# Patient Record
Sex: FEMALE | Race: WHITE | NOT HISPANIC OR LATINO | Employment: UNEMPLOYED | URBAN - METROPOLITAN AREA
[De-identification: names, ages, dates, MRNs, and addresses within clinical notes are randomized per-mention and may not be internally consistent; named-entity substitution may affect disease eponyms.]

---

## 2017-02-03 ENCOUNTER — RESULTS ENCOUNTER (OUTPATIENT)
Dept: FAMILY MEDICINE CLINIC | Facility: CLINIC | Age: 52
End: 2017-02-03

## 2017-02-03 DIAGNOSIS — IMO0002 DIABETES MELLITUS TYPE 2, UNCONTROLLED: ICD-10-CM

## 2017-02-03 DIAGNOSIS — K21.9 GASTROESOPHAGEAL REFLUX DISEASE, ESOPHAGITIS PRESENCE NOT SPECIFIED: ICD-10-CM

## 2017-02-03 DIAGNOSIS — F41.9 ANXIETY: ICD-10-CM

## 2017-02-03 DIAGNOSIS — E78.5 HYPERLIPIDEMIA: ICD-10-CM

## 2017-02-03 DIAGNOSIS — I10 ESSENTIAL HYPERTENSION: ICD-10-CM

## 2017-03-01 ENCOUNTER — OFFICE (AMBULATORY)
Dept: URBAN - METROPOLITAN AREA CLINIC 64 | Facility: CLINIC | Age: 52
End: 2017-03-01

## 2017-03-01 ENCOUNTER — AMBULATORY SURGICAL CENTER (AMBULATORY)
Dept: URBAN - METROPOLITAN AREA SURGERY 17 | Facility: SURGERY | Age: 52
End: 2017-03-01

## 2017-03-01 VITALS
DIASTOLIC BLOOD PRESSURE: 60 MMHG | HEART RATE: 76 BPM | SYSTOLIC BLOOD PRESSURE: 93 MMHG | DIASTOLIC BLOOD PRESSURE: 56 MMHG | RESPIRATION RATE: 19 BRPM | DIASTOLIC BLOOD PRESSURE: 85 MMHG | DIASTOLIC BLOOD PRESSURE: 62 MMHG | HEART RATE: 77 BPM | DIASTOLIC BLOOD PRESSURE: 90 MMHG | SYSTOLIC BLOOD PRESSURE: 86 MMHG | OXYGEN SATURATION: 98 % | SYSTOLIC BLOOD PRESSURE: 127 MMHG | OXYGEN SATURATION: 97 % | DIASTOLIC BLOOD PRESSURE: 74 MMHG | RESPIRATION RATE: 20 BRPM | RESPIRATION RATE: 21 BRPM | DIASTOLIC BLOOD PRESSURE: 70 MMHG | SYSTOLIC BLOOD PRESSURE: 99 MMHG | DIASTOLIC BLOOD PRESSURE: 48 MMHG | WEIGHT: 190 LBS | SYSTOLIC BLOOD PRESSURE: 104 MMHG | RESPIRATION RATE: 18 BRPM | HEART RATE: 70 BPM | HEART RATE: 73 BPM | OXYGEN SATURATION: 96 % | OXYGEN SATURATION: 93 % | SYSTOLIC BLOOD PRESSURE: 97 MMHG | RESPIRATION RATE: 22 BRPM | DIASTOLIC BLOOD PRESSURE: 58 MMHG | RESPIRATION RATE: 23 BRPM | HEIGHT: 62 IN | SYSTOLIC BLOOD PRESSURE: 136 MMHG | SYSTOLIC BLOOD PRESSURE: 115 MMHG | HEART RATE: 74 BPM | DIASTOLIC BLOOD PRESSURE: 59 MMHG | DIASTOLIC BLOOD PRESSURE: 51 MMHG | SYSTOLIC BLOOD PRESSURE: 128 MMHG | HEART RATE: 75 BPM | SYSTOLIC BLOOD PRESSURE: 98 MMHG | TEMPERATURE: 98 F | HEART RATE: 83 BPM | RESPIRATION RATE: 17 BRPM | HEART RATE: 78 BPM | RESPIRATION RATE: 15 BRPM | TEMPERATURE: 98.3 F | RESPIRATION RATE: 16 BRPM | SYSTOLIC BLOOD PRESSURE: 89 MMHG | HEART RATE: 66 BPM

## 2017-03-01 DIAGNOSIS — R12 HEARTBURN: ICD-10-CM

## 2017-03-01 DIAGNOSIS — D12.3 BENIGN NEOPLASM OF TRANSVERSE COLON: ICD-10-CM

## 2017-03-01 DIAGNOSIS — D12.0 BENIGN NEOPLASM OF CECUM: ICD-10-CM

## 2017-03-01 DIAGNOSIS — K29.70 GASTRITIS, UNSPECIFIED, WITHOUT BLEEDING: ICD-10-CM

## 2017-03-01 DIAGNOSIS — K59.00 CONSTIPATION, UNSPECIFIED: ICD-10-CM

## 2017-03-01 DIAGNOSIS — D12.4 BENIGN NEOPLASM OF DESCENDING COLON: ICD-10-CM

## 2017-03-01 DIAGNOSIS — K59.1 FUNCTIONAL DIARRHEA: ICD-10-CM

## 2017-03-01 DIAGNOSIS — R14.0 ABDOMINAL DISTENSION (GASEOUS): ICD-10-CM

## 2017-03-01 DIAGNOSIS — K20.9 ESOPHAGITIS, UNSPECIFIED: ICD-10-CM

## 2017-03-01 DIAGNOSIS — R10.84 GENERALIZED ABDOMINAL PAIN: ICD-10-CM

## 2017-03-01 DIAGNOSIS — K31.7 POLYP OF STOMACH AND DUODENUM: ICD-10-CM

## 2017-03-01 DIAGNOSIS — R11.0 NAUSEA: ICD-10-CM

## 2017-03-01 PROBLEM — K64.8 OTHER HEMORRHOIDS: Status: ACTIVE | Noted: 2017-03-01

## 2017-03-01 LAB
GI HISTOLOGY: A. UNSPECIFIED: (no result)
GI HISTOLOGY: B. UNSPECIFIED: (no result)
GI HISTOLOGY: C. UNSPECIFIED: (no result)
GI HISTOLOGY: D. UNSPECIFIED: (no result)
GI HISTOLOGY: E. UNSPECIFIED: (no result)
GI HISTOLOGY: F. UNSPECIFIED: (no result)
GI HISTOLOGY: G. UNSPECIFIED: (no result)
GI HISTOLOGY: PDF REPORT: (no result)

## 2017-03-01 PROCEDURE — 43239 EGD BIOPSY SINGLE/MULTIPLE: CPT

## 2017-03-01 PROCEDURE — 88305 TISSUE EXAM BY PATHOLOGIST: CPT

## 2017-03-01 PROCEDURE — 45380 COLONOSCOPY AND BIOPSY: CPT

## 2017-03-01 RX ADMIN — PROPOFOL 100 MG: 10 INJECTION, EMULSION INTRAVENOUS at 13:16

## 2017-03-01 RX ADMIN — PROPOFOL 100 MG: 10 INJECTION, EMULSION INTRAVENOUS at 13:12

## 2017-03-01 RX ADMIN — PROPOFOL 100 MG: 10 INJECTION, EMULSION INTRAVENOUS at 12:59

## 2017-03-01 RX ADMIN — PROPOFOL 100 MG: 10 INJECTION, EMULSION INTRAVENOUS at 12:55

## 2017-03-01 RX ADMIN — PROPOFOL 100 MG: 10 INJECTION, EMULSION INTRAVENOUS at 13:07

## 2017-03-01 RX ADMIN — PROPOFOL 100 MG: 10 INJECTION, EMULSION INTRAVENOUS at 13:20

## 2017-03-01 RX ADMIN — PROPOFOL 100 MG: 10 INJECTION, EMULSION INTRAVENOUS at 13:03

## 2017-03-08 ENCOUNTER — OFFICE VISIT (OUTPATIENT)
Dept: FAMILY MEDICINE CLINIC | Facility: CLINIC | Age: 52
End: 2017-03-08

## 2017-03-08 VITALS
TEMPERATURE: 98 F | SYSTOLIC BLOOD PRESSURE: 130 MMHG | RESPIRATION RATE: 16 BRPM | DIASTOLIC BLOOD PRESSURE: 78 MMHG | WEIGHT: 188 LBS | HEIGHT: 62 IN | HEART RATE: 68 BPM | BODY MASS INDEX: 34.6 KG/M2

## 2017-03-08 DIAGNOSIS — G47.9 SLEEP DISTURBANCE: ICD-10-CM

## 2017-03-08 DIAGNOSIS — F41.9 ANXIETY: ICD-10-CM

## 2017-03-08 DIAGNOSIS — IMO0001 UNCONTROLLED TYPE 2 DIABETES MELLITUS WITHOUT COMPLICATION, WITHOUT LONG-TERM CURRENT USE OF INSULIN: ICD-10-CM

## 2017-03-08 DIAGNOSIS — I10 ESSENTIAL HYPERTENSION: Primary | ICD-10-CM

## 2017-03-08 DIAGNOSIS — K21.9 GASTROESOPHAGEAL REFLUX DISEASE, ESOPHAGITIS PRESENCE NOT SPECIFIED: ICD-10-CM

## 2017-03-08 DIAGNOSIS — Z12.31 ENCOUNTER FOR SCREENING MAMMOGRAM FOR BREAST CANCER: ICD-10-CM

## 2017-03-08 DIAGNOSIS — E78.49 OTHER HYPERLIPIDEMIA: ICD-10-CM

## 2017-03-08 PROBLEM — E66.9 OBESITY (BMI 30-39.9): Status: ACTIVE | Noted: 2017-03-08

## 2017-03-08 PROCEDURE — 99214 OFFICE O/P EST MOD 30 MIN: CPT | Performed by: FAMILY MEDICINE

## 2017-03-08 RX ORDER — ATORVASTATIN CALCIUM 20 MG/1
20 TABLET, FILM COATED ORAL NIGHTLY
Qty: 30 TABLET | Refills: 5 | Status: SHIPPED | OUTPATIENT
Start: 2017-03-08 | End: 2017-09-06 | Stop reason: SDUPTHER

## 2017-03-08 RX ORDER — HYDROCHLOROTHIAZIDE 25 MG/1
25 TABLET ORAL DAILY
Qty: 30 TABLET | Refills: 5 | Status: SHIPPED | OUTPATIENT
Start: 2017-03-08 | End: 2017-09-06 | Stop reason: SDUPTHER

## 2017-03-08 RX ORDER — AMLODIPINE BESYLATE 5 MG/1
5 TABLET ORAL DAILY
Qty: 30 TABLET | Refills: 5 | Status: SHIPPED | OUTPATIENT
Start: 2017-03-08 | End: 2017-09-06 | Stop reason: SDUPTHER

## 2017-03-08 RX ORDER — OMEPRAZOLE 40 MG/1
40 CAPSULE, DELAYED RELEASE ORAL NIGHTLY
Qty: 30 CAPSULE | Refills: 5 | Status: SHIPPED | OUTPATIENT
Start: 2017-03-08 | End: 2017-09-06 | Stop reason: SDUPTHER

## 2017-03-08 RX ORDER — CARVEDILOL 12.5 MG/1
12.5 TABLET ORAL 2 TIMES DAILY WITH MEALS
Qty: 60 TABLET | Refills: 5 | Status: SHIPPED | OUTPATIENT
Start: 2017-03-08 | End: 2017-09-06 | Stop reason: SDUPTHER

## 2017-03-08 RX ORDER — METFORMIN HYDROCHLORIDE 500 MG/1
1000 TABLET, EXTENDED RELEASE ORAL
Qty: 60 TABLET | Refills: 5 | Status: SHIPPED | OUTPATIENT
Start: 2017-03-08 | End: 2017-09-06 | Stop reason: SDUPTHER

## 2017-03-08 NOTE — PROGRESS NOTES
"Chief Complaint   Patient presents with   • Hypertension   • Hyperlipidemia       Subjective   This patient presents the office for medicine refill.  Labs are improved.  She continues to have some elevation of triglycerides.  Her liver function tests have markedly improved.  There has been interval weight loss.  Blood pressure is to goal.  LDL cholesterol is to goal.  GERD symptoms are stable.  She has had recent endoscopies which were reportedly normal.  She continues to have excess fatigue.  In the past she was evaluated for sleep apnea but she was not fitted with a mask.  We will re-evaluate for sleep apnea.      Review of Systems   Constitutional: Positive for fatigue.   Cardiovascular: Negative for chest pain.       Objective   Visit Vitals   • /78   • Pulse 68   • Temp 98 °F (36.7 °C) (Oral)   • Resp 16   • Ht 62\" (157.5 cm)   • Wt 188 lb (85.3 kg)   • BMI 34.39 kg/m2     Body mass index is 34.39 kg/(m^2).  Physical Exam   Constitutional: She is cooperative. No distress.   Eyes: Conjunctivae and lids are normal.   Neck: Carotid bruit is not present. No tracheal deviation present.   Cardiovascular: Normal rate, regular rhythm and normal heart sounds.    No murmur heard.  Pulmonary/Chest: Effort normal and breath sounds normal.   Neurological: She is alert. She is not disoriented.   Skin: Skin is warm and dry.   Multiple tattoos   Psychiatric: She has a normal mood and affect. Her speech is normal and behavior is normal.   Vitals reviewed.      Assessment/Plan     Problem List Items Addressed This Visit        Cardiovascular and Mediastinum    Hyperlipidemia    Relevant Medications    atorvastatin (LIPITOR) 20 MG tablet    Other Relevant Orders    Lipid Panel With LDL/HDL Ratio    Essential hypertension - Primary    Relevant Medications    amLODIPine (NORVASC) 5 MG tablet    hydrochlorothiazide (HYDRODIURIL) 25 MG tablet    carvedilol (COREG) 12.5 MG tablet    Other Relevant Orders    Comprehensive " metabolic panel    CBC and Differential    TSH       Digestive    GERD (gastroesophageal reflux disease)    Relevant Medications    omeprazole (priLOSEC) 40 MG capsule       Endocrine    Diabetes mellitus type 2, uncontrolled    Relevant Medications    metFORMIN ER (GLUCOPHAGE-XR) 500 MG 24 hr tablet    Other Relevant Orders    Hemoglobin A1c    MicroAlbumin, Urine, Random    Ambulatory referral to Ophthalmology       Other    Anxiety    Relevant Medications    sertraline (ZOLOFT) 50 MG tablet    Sleep disturbance    Relevant Orders    Ambulatory Referral to Sleep Medicine      Other Visit Diagnoses     Encounter for screening mammogram for breast cancer        Relevant Orders    Mammo Screening Bilateral With CAD          Outpatient Encounter Prescriptions as of 3/8/2017   Medication Sig Dispense Refill   • ALPRAZolam (XANAX) 0.5 MG tablet   4   • aspirin 81 MG tablet Take 81 mg by mouth daily.     • Omega-3 Fatty Acids (FISH OIL PO) Take  by mouth.     • amLODIPine (NORVASC) 5 MG tablet Take 1 tablet by mouth Daily for 180 days. 30 tablet 5   • atorvastatin (LIPITOR) 20 MG tablet Take 1 tablet by mouth Every Night for 180 days. 30 tablet 5   • carvedilol (COREG) 12.5 MG tablet Take 1 tablet by mouth 2 (Two) Times a Day With Meals for 180 days. 60 tablet 5   • hydrochlorothiazide (HYDRODIURIL) 25 MG tablet Take 1 tablet by mouth Daily for 180 days. 30 tablet 5   • metFORMIN ER (GLUCOPHAGE-XR) 500 MG 24 hr tablet Take 2 tablets by mouth Daily With Dinner for 180 days. 60 tablet 5   • omeprazole (priLOSEC) 40 MG capsule Take 1 capsule by mouth Every Night for 180 days. 30 capsule 5   • sertraline (ZOLOFT) 50 MG tablet Take 1 tablet by mouth Daily for 180 days. 30 tablet 5   • [DISCONTINUED] amLODIPine (NORVASC) 5 MG tablet Take 1 tablet by mouth daily for 180 days. 30 tablet 5   • [DISCONTINUED] atorvastatin (LIPITOR) 20 MG tablet Take 1 tablet by mouth every night for 180 days. 30 tablet 5   • [DISCONTINUED]  carvedilol (COREG) 12.5 MG tablet Take 1 tablet by mouth 2 (two) times a day with meals for 180 days. 60 tablet 5   • [DISCONTINUED] hydrochlorothiazide (HYDRODIURIL) 25 MG tablet Take 1 tablet by mouth daily for 180 days. 30 tablet 5   • [DISCONTINUED] metFORMIN XR (GLUCOPHAGE-XR) 500 MG 24 hr tablet Take 2 tablets by mouth daily with dinner for 180 days. 60 tablet 5   • [DISCONTINUED] omeprazole (PriLOSEC) 40 MG capsule Take 1 capsule by mouth every night for 180 days. 30 capsule 5   • [DISCONTINUED] sertraline (ZOLOFT) 50 MG tablet Take 1 tablet by mouth daily for 180 days. 30 tablet 5     No facility-administered encounter medications on file as of 3/8/2017.        Orders Placed This Encounter   Procedures   • Mammo Screening Bilateral With CAD     Standing Status:   Future     Standing Expiration Date:   3/9/2018     Order Specific Question:   Reason for Exam:     Answer:   screening     Order Specific Question:   Is the patient pregnant?     Answer:   No   • Comprehensive metabolic panel     Standing Status:   Future     Standing Expiration Date:   12/3/2017   • Lipid Panel With LDL/HDL Ratio     Standing Status:   Future     Standing Expiration Date:   12/3/2017   • TSH     Standing Status:   Future     Standing Expiration Date:   12/3/2017   • Hemoglobin A1c     Standing Status:   Future     Standing Expiration Date:   12/3/2017   • MicroAlbumin, Urine, Random     Standing Status:   Future     Standing Expiration Date:   12/3/2017   • Ambulatory Referral to Sleep Medicine     Referral Priority:   Routine     Referral Type:   Consultation     Referral Reason:   Specialty Services Required     Requested Specialty:   Sleep Medicine     Number of Visits Requested:   1   • Ambulatory referral to Ophthalmology     Referral Priority:   Routine     Referral Type:   Consultation     Referral Reason:   Specialty Services Required     Referred to Provider:   Tammy Milton MD     Requested Specialty:    Ophthalmology     Number of Visits Requested:   1       Continue with current treatment plan.  Diet given. Stress reduction with exercise       F/U in 6 months

## 2017-03-08 NOTE — PATIENT INSTRUCTIONS
Serving Sizes  A serving size is a measured amount of food or drink, such as one slice of bread, that has an associated nutrient content. Knowing the serving size of a food or drink can help you determine how much of that food you should consume.   WHAT IS THE SIZE OF ONE SERVING?  The size of one healthy serving depends on the food or drink. To determine a serving size, read the food label. If the food or drink does not have a food label, try to find serving size information online. Or, use the following to estimate the size of one adult serving:   Grain  1 slice bread. ½ bagel. ½ cup pasta.   Vegetable  ½ cup cooked or canned vegetables. 1 cup raw, leafy greens.   Fruit  ½ cup canned fruit. 1 medium fruit. ¼ cup dried fruit.   Meat and Other Protein Sources  1 oz meat, poultry, or fish. ¼ cup cooked beans. 1 egg. ¼ cup nuts or seeds. 1 Tbsp nut butter. ¼ cup tofu or tempeh. 2 Tbsp hummus.   Dairy  An individual container of yogurt (6-8 oz). 1 piece of cheese the size of your thumb (1 oz). 1 cup (8 oz) milk or milk alternative.   Fat  A piece the size of one dice. 1 tsp soft margarine. 1 Tbsp mayonnaise. 1 tsp vegetable oil. 1 Tbsp regular salad dressing. 2 Tbsp low-fat salad dressing.   HOW MANY SERVINGS SHOULD I EAT FROM EACH FOOD GROUP EACH DAY?   The following are the suggested number of servings to try and have every day from each food group. You can also look at your eating throughout the week and aim for meeting these requirements on most days for overall healthy eating.   Grain  6-8 servings. Try to have half of your grains from whole grains, such as whole wheat bread, corn tortillas, oatmeal, brown rice, whole wheat pasta, and bulgur.  Vegetable  At least 2½-3 servings.   Fruit  2 servings.   Meat and Other Protein Foods  5-6 servings. Aim to have lean proteins, such as chicken, turkey, fish, beans, or tofu.  Dairy  3 servings. Choose low-fat or nonfat if you are trying to control your weight.   Fat  2-3  servings.   IS A SERVING THE SAME THING AS A PORTION?  No. A portion is the actual amount you eat, which may be more than one serving. Knowing the specific serving size of a food and the nutritional information that goes with it can help you make a healthy decision on what size portion to eat.   WHAT ARE SOME TIPS TO HELP ME LEARN HEALTHY SERVING SIZES?  · Check food labels for serving sizes. Many foods that come as a single portion actually contain multiple servings.  · Determine the serving size of foods you commonly eat and figure out how large a portion you usually eat.  · Measure the number of servings that can be held by the bowls, glasses, cups, and plates you typically use. For example, pour your breakfast cereal into your regular bowl and then pour it into a measuring cup.  · For 1-2 days, measure the serving sizes of all the foods you eat.  · Practice estimating serving sizes and determining how big your portions should be.     This information is not intended to replace advice given to you by your health care provider. Make sure you discuss any questions you have with your health care provider.     Document Released: 09/15/2004 Document Revised: 01/08/2016 Document Reviewed: 03/17/2015  [employer] Interactive Patient Education ©2016 [employer]      Adjustment Disorder  Adjustment disorder is an unusually severe reaction to a stressful life event, such as the loss of a job or physical illness. The event may be any stressful event other than the loss of a loved one. Adjustment disorder may affect your feelings, your thinking, how you act, or a combination of these. It may interfere with personal relationships or with the way you are at work, school, or home. People with this disorder are at risk for suicide and substance abuse. They may develop a more serious mental disorder, such as major depressive disorder or post-traumatic stress disorder.  SIGNS AND SYMPTOMS   Symptoms may include:  · Sadness, depressed  mood, or crying spells.  · Loss of enjoyment.  · Change in appetite or weight.  · Sense of loss or hopelessness.  · Thoughts of suicide.  · Anxiety, worry, or nervousness.  · Trouble sleeping.  · Avoiding family and friends.  · Poor school performance.  · Fighting or vandalism.  · Reckless driving.  · Skipping school.  · Poor work performance.  · Ignoring bills.  Symptoms of adjustment disorder start within 3 months of the stressful life event. They do not last more than 6 months after the event has ended.  DIAGNOSIS   To make a diagnosis, your health care provider will ask about what has happened in your life and how it has affected you. He or she may also ask about your medical history and use of medicines, alcohol, and other substances. Your health care provider may do a physical exam and order lab tests or other studies. You may be referred to a mental health specialist for evaluation.  TREATMENT   Treatment options include:  · Counseling or talk therapy. Talk therapy is usually provided by mental health specialists.  · Medicine. Certain medicines may help with depression, anxiety, and sleep.  · Support groups. Support groups offer emotional support, advice, and guidance. They are made up of people who have had similar experiences.  HOME CARE INSTRUCTIONS  · Keep all follow-up visits as directed by your health care provider. This is important.  · Take medicines only as directed by your health care provider.  SEEK MEDICAL CARE IF:   Your symptoms get worse.   SEEK IMMEDIATE MEDICAL CARE IF:  You have serious thoughts about hurting yourself or someone else.  MAKE SURE YOU:  · Understand these instructions.  · Will watch your condition.  · Will get help right away if you are not doing well or get worse.     This information is not intended to replace advice given to you by your health care provider. Make sure you discuss any questions you have with your health care provider.     Document Released: 08/22/2007  Document Revised: 11/28/2016 Document Reviewed: 05/12/2015  [employer] Interactive Patient Education ©2016 [employer]

## 2017-03-23 DIAGNOSIS — E78.5 HYPERLIPIDEMIA: ICD-10-CM

## 2017-03-23 DIAGNOSIS — I10 ESSENTIAL HYPERTENSION: ICD-10-CM

## 2017-03-23 DIAGNOSIS — F41.9 ANXIETY: ICD-10-CM

## 2017-03-23 DIAGNOSIS — IMO0002 DIABETES MELLITUS TYPE 2, UNCONTROLLED: ICD-10-CM

## 2017-03-23 RX ORDER — CARVEDILOL 12.5 MG/1
TABLET ORAL
Qty: 60 TABLET | Refills: 0 | OUTPATIENT
Start: 2017-03-23

## 2017-03-23 RX ORDER — HYDROCHLOROTHIAZIDE 25 MG/1
TABLET ORAL
Qty: 30 TABLET | Refills: 0 | OUTPATIENT
Start: 2017-03-23

## 2017-03-23 RX ORDER — METFORMIN HYDROCHLORIDE 500 MG/1
TABLET, EXTENDED RELEASE ORAL
Qty: 60 TABLET | Refills: 0 | OUTPATIENT
Start: 2017-03-23

## 2017-03-23 RX ORDER — ATORVASTATIN CALCIUM 20 MG/1
TABLET, FILM COATED ORAL
Qty: 30 TABLET | Refills: 0 | OUTPATIENT
Start: 2017-03-23

## 2017-03-23 RX ORDER — AMLODIPINE BESYLATE 5 MG/1
TABLET ORAL
Qty: 30 TABLET | Refills: 0 | OUTPATIENT
Start: 2017-03-23

## 2017-04-19 ENCOUNTER — PROCEDURE VISIT (OUTPATIENT)
Dept: FAMILY MEDICINE CLINIC | Facility: CLINIC | Age: 52
End: 2017-04-19

## 2017-04-19 VITALS
WEIGHT: 190 LBS | HEART RATE: 74 BPM | RESPIRATION RATE: 16 BRPM | HEIGHT: 62 IN | DIASTOLIC BLOOD PRESSURE: 87 MMHG | BODY MASS INDEX: 34.96 KG/M2 | SYSTOLIC BLOOD PRESSURE: 131 MMHG | TEMPERATURE: 98.1 F

## 2017-04-19 DIAGNOSIS — Z12.31 ENCOUNTER FOR SCREENING MAMMOGRAM FOR BREAST CANCER: ICD-10-CM

## 2017-04-19 DIAGNOSIS — Z01.419 WELL FEMALE EXAM WITH ROUTINE GYNECOLOGICAL EXAM: Primary | ICD-10-CM

## 2017-04-19 DIAGNOSIS — E66.9 OBESITY (BMI 30-39.9): ICD-10-CM

## 2017-04-19 PROCEDURE — 99396 PREV VISIT EST AGE 40-64: CPT | Performed by: FAMILY MEDICINE

## 2017-04-19 NOTE — PROGRESS NOTES
"Chief Complaint   Patient presents with   • Gynecologic Exam       Subjective   This patient presents today for annual GYN examination.  She does not have any breast symptoms.  She has not had Pap smears since 2014.  Currently she is not having any vaginal discharge or vaginal problems.  Her periods continue to be irregular.  They have slowed somewhat.  She now gets them about every 2 months or so.  She has never stopped having periods so far.  Data Reviewed:    No visits with results within 30 Day(s) from this visit.  Latest known visit with results is:    Conversion Encounter on 09/06/2016   Component Date Value   • Glucose 09/06/2016 122*   • Hemoglobin A1C 09/06/2016 7.2*   • BUN 09/06/2016 6    • Creatinine 09/06/2016 0.56*   • eGFR Non African Am 09/06/2016 108    • eGFR  Am 09/06/2016 124    • BUN/Creatinine Ratio 09/06/2016 11    • Sodium 09/06/2016 137    • Potassium 09/06/2016 4.2    • Chloride 09/06/2016 92*   • Total CO2 09/06/2016 25    • Calcium 09/06/2016 10.0    • Total Protein 09/06/2016 7.4    • Albumin 09/06/2016 4.5    • Globulin 09/06/2016 2.9    • A/G Ratio 09/06/2016 1.6    • Total Bilirubin 09/06/2016 0.4    • Alkaline Phosphatase 09/06/2016 126*   • AST (SGOT) 09/06/2016 28    • ALT (SGPT) 09/06/2016 33*   • Total Cholesterol 09/06/2016 109    • Triglycerides 09/06/2016 191*   • HDL Cholesterol 09/06/2016 38*   • VLDL Cholesterol 09/06/2016 38    • LDL Cholesterol  09/06/2016 33    • TSH 09/06/2016 2.460    • Creatinine, Urine 09/06/2016 83.2    • Microalbumin, Urine 09/06/2016 4.0    • Microalbumin/Creatinine * 09/06/2016 4.8        Social History   Substance Use Topics   • Smoking status: Never Smoker   • Smokeless tobacco: Never Used   • Alcohol use Yes       Review of Systems   Constitutional:        Denies breast pain or discharge   Genitourinary: Negative for vaginal discharge.       Objective   /87  Pulse 74  Temp 98.1 °F (36.7 °C) (Oral)   Resp 16  Ht 62\" (157.5 cm) "  Wt 190 lb (86.2 kg)  BMI 34.75 kg/m2  Body mass index is 34.75 kg/(m^2).  Physical Exam   Constitutional: She appears well-developed. No distress.   Genitourinary: Vagina normal and uterus normal. Pelvic exam was performed with patient supine. No labial fusion. There is no rash, tenderness or lesion on the right labia. There is no rash, tenderness or lesion on the left labia. Cervix exhibits no motion tenderness, no discharge and no friability. Right adnexum displays no mass and no tenderness. Left adnexum displays no mass and no tenderness.   Genitourinary Comments: PAP done   Vitals reviewed.      Assessment/Plan     Problem List Items Addressed This Visit        Digestive    Obesity (BMI 30-39.9)      Other Visit Diagnoses     Well female exam with routine gynecological exam    -  Primary    Relevant Orders    PapIG, HPV, Rfx 16 / 18    Encounter for screening mammogram for breast cancer        Relevant Orders    Mammo Screening Bilateral With CAD          Outpatient Encounter Prescriptions as of 4/19/2017   Medication Sig Dispense Refill   • ALPRAZolam (XANAX) 0.5 MG tablet   4   • amLODIPine (NORVASC) 5 MG tablet Take 1 tablet by mouth Daily for 180 days. 30 tablet 5   • aspirin 81 MG tablet Take 81 mg by mouth daily.     • atorvastatin (LIPITOR) 20 MG tablet Take 1 tablet by mouth Every Night for 180 days. 30 tablet 5   • carvedilol (COREG) 12.5 MG tablet Take 1 tablet by mouth 2 (Two) Times a Day With Meals for 180 days. 60 tablet 5   • hydrochlorothiazide (HYDRODIURIL) 25 MG tablet Take 1 tablet by mouth Daily for 180 days. 30 tablet 5   • metFORMIN ER (GLUCOPHAGE-XR) 500 MG 24 hr tablet Take 2 tablets by mouth Daily With Dinner for 180 days. 60 tablet 5   • Omega-3 Fatty Acids (FISH OIL PO) Take  by mouth.     • omeprazole (priLOSEC) 40 MG capsule Take 1 capsule by mouth Every Night for 180 days. 30 capsule 5   • sertraline (ZOLOFT) 50 MG tablet Take 1 tablet by mouth Daily for 180 days. 30 tablet 5      No facility-administered encounter medications on file as of 4/19/2017.        Orders Placed This Encounter   Procedures   • Mammo Screening Bilateral With CAD     Standing Status:   Future     Standing Expiration Date:   10/16/2017     Order Specific Question:   Reason for Exam:     Answer:   screening     Order Specific Question:   Patient Pregnant     Answer:   No       Continue with current treatment plan.         F/U in one year for breast exam and mammogram, keep regular follow up appts.

## 2017-04-19 NOTE — PATIENT INSTRUCTIONS
Obesity, Adult  Obesity is the condition of having too much total body fat. Being overweight or obese means that your weight is greater than what is considered healthy for your body size. Obesity is determined by a measurement called BMI. BMI is an estimate of body fat and is calculated from height and weight. For adults, a BMI of 30 or higher is considered obese.  Obesity can eventually lead to other health concerns and major illnesses, including:  · Stroke.  · Coronary artery disease (CAD).  · Type 2 diabetes.  · Some types of cancer, including cancers of the colon, breast, uterus, and gallbladder.  · Osteoarthritis.  · High blood pressure (hypertension).  · High cholesterol.  · Sleep apnea.  · Gallbladder stones.  · Infertility problems.   CAUSES  The main cause of obesity is taking in (consuming) more calories than your body uses for energy. Other factors that contribute to this condition may include:  · Being born with genes that make you more likely to become obese.  · Having a medical condition that causes obesity. These conditions include:    Hypothyroidism.    Polycystic ovarian syndrome (PCOS).    Binge-eating disorder.    Cushing syndrome.  · Taking certain medicines, such as steroids, antidepressants, and seizure medicines.  · Not being physically active (sedentary lifestyle).  · Living where there are limited places to exercise safely or buy healthy foods.  · Not getting enough sleep.  RISK FACTORS  The following factors may increase your risk of this condition:  · Having a family history of obesity.  · Being a woman of -American descent.  · Being a man of  descent.  SYMPTOMS  Having excessive body fat is the main symptom of this condition.  DIAGNOSIS  This condition may be diagnosed based on:  · Your symptoms.  · Your medical history.  · A physical exam. Your health care provider may measure:    Your BMI. If you are an adult with a BMI between 25 and less than 30, you are considered  overweight. If you are an adult with a BMI of 30 or higher, you are considered obese.    The distances around your hips and your waist (circumferences). These may be compared to each other to help diagnose your condition.    Your skinfold thickness. Your health care provider may gently pinch a fold of your skin and measure it.  TREATMENT  Treatment for this condition often includes changing your lifestyle. Treatment may include some or all of the following:  · Dietary changes. Work with your health care provider and a dietitian to set a weight-loss goal that is healthy and reasonable for you. Dietary changes may include eating:    Smaller portions. A portion size is the amount of a particular food that is healthy for you to eat at one time. This varies from person to person.    Low-calorie or low-fat options.    More whole grains, fruits, and vegetables.  · Regular physical activity. This may include aerobic activity (cardio) and strength training.  · Medicine to help you lose weight. Your health care provider may prescribe medicine if you are unable to lose 1 pound a week after 6 weeks of eating more healthily and doing more physical activity.  · Surgery. Surgical options may include gastric banding and gastric bypass. Surgery may be done if:    Other treatments have not helped to improve your condition.    You have a BMI of 40 or higher.    You have life-threatening health problems related to obesity.  HOME CARE INSTRUCTIONS  Eating and Drinking  · Follow recommendations from your health care provider about what you eat and drink. Your health care provider may advise you to:    Limit fast foods, sweets, and processed snack foods.    Choose low-fat options, such as low-fat milk instead of whole milk.    Eat 5 or more servings of fruits or vegetables every day.    Eat at home more often. This gives you more control over what you eat.    Choose healthy foods when you eat out.    Learn what a healthy portion size  is.    Keep low-fat snacks on hand.    Avoid sugary drinks, such as soda, fruit juice, iced tea sweetened with sugar, and flavored milk.    Eat a healthy breakfast.  · Drink enough water to keep your urine clear or pale yellow.  · Do not go without eating for long periods of time (do not fast) or follow a fad diet. Fasting and fad diets can be unhealthy and even dangerous.  Physical Activity  · Exercise regularly, as told by your health care provider. Ask your health care provider what types of exercise are safe for you and how often you should exercise.  · Warm up and stretch before being active.  · Cool down and stretch after being active.  · Rest between periods of activity.  Lifestyle  · Limit the time that you spend in front of your TV, computer, or video game system.  · Find ways to reward yourself that do not involve food.  · Limit alcohol intake to no more than 1 drink a day for nonpregnant women and 2 drinks a day for men. One drink equals 12 oz of beer, 5 oz of wine, or 1½ oz of hard liquor.  General Instructions  · Keep a weight loss journal to keep track of the food you eat and how much you exercise you get.  · Take over-the-counter and prescription medicines only as told by your health care provider.  · Take vitamins and supplements only as told by your health care provider.  · Consider joining a support group. Your health care provider may be able to recommend a support group.  · Keep all follow-up visits as told by your health care provider. This is important.  SEEK MEDICAL CARE IF:  · You are unable to meet your weight loss goal after 6 weeks of dietary and lifestyle changes.     This information is not intended to replace advice given to you by your health care provider. Make sure you discuss any questions you have with your health care provider.     Document Released: 01/25/2006 Document Revised: 01/13/2017 Document Reviewed: 10/05/2016  Elsevier Interactive Patient Education ©2016 [employer]      Exercising to Lose Weight  Exercising can help you to lose weight. In order to lose weight through exercise, you need to do vigorous-intensity exercise. You can tell that you are exercising with vigorous intensity if you are breathing very hard and fast and cannot hold a conversation while exercising.  Moderate-intensity exercise helps to maintain your current weight. You can tell that you are exercising at a moderate level if you have a higher heart rate and faster breathing, but you are still able to hold a conversation.  HOW OFTEN SHOULD I EXERCISE?  Choose an activity that you enjoy and set realistic goals. Your health care provider can help you to make an activity plan that works for you. Exercise regularly as directed by your health care provider. This may include:  · Doing resistance training twice each week, such as:    Push-ups.    Sit-ups.    Lifting weights.    Using resistance bands.  · Doing a given intensity of exercise for a given amount of time. Choose from these options:    150 minutes of moderate-intensity exercise every week.    75 minutes of vigorous-intensity exercise every week.    A mix of moderate-intensity and vigorous-intensity exercise every week.  Children, pregnant women, people who are out of shape, people who are overweight, and older adults may need to consult a health care provider for individual recommendations. If you have any sort of medical condition, be sure to consult your health care provider before starting a new exercise program.  WHAT ARE SOME ACTIVITIES THAT CAN HELP ME TO LOSE WEIGHT?   · Walking at a rate of at least 4.5 miles an hour.  · Jogging or running at a rate of 5 miles per hour.  · Biking at a rate of at least 10 miles per hour.  · Lap swimming.  · Roller-skating or in-line skating.  · Cross-country skiing.  · Vigorous competitive sports, such as football, basketball, and soccer.  · Jumping rope.  · Aerobic dancing.  HOW CAN I BE MORE ACTIVE IN MY  DAY-TO-DAY ACTIVITIES?  · Use the stairs instead of the elevator.  · Take a walk during your lunch break.  · If you drive, park your car farther away from work or school.  · If you take public transportation, get off one stop early and walk the rest of the way.  · Make all of your phone calls while standing up and walking around.  · Get up, stretch, and walk around every 30 minutes throughout the day.  WHAT GUIDELINES SHOULD I FOLLOW WHILE EXERCISING?  · Do not exercise so much that you hurt yourself, feel dizzy, or get very short of breath.  · Consult your health care provider prior to starting a new exercise program.  · Wear comfortable clothes and shoes with good support.  · Drink plenty of water while you exercise to prevent dehydration or heat stroke. Body water is lost during exercise and must be replaced.  · Work out until you breathe faster and your heart beats faster.     This information is not intended to replace advice given to you by your health care provider. Make sure you discuss any questions you have with your health care provider.     Document Released: 01/20/2012 Document Revised: 01/08/2016 Document Reviewed: 05/21/2015  Fingerprint Interactive Patient Education ©2016 Elsevier Inc.      MyPlate from [employer]  The general, healthful diet is based on the 2010 Dietary Guidelines for Americans. The amount of food you need to eat from each food group depends on your age, sex, and level of physical activity and can be individualized by a dietitian. Go to ChooseMyPlate.gov for more information.  WHAT DO I NEED TO KNOW ABOUT THE MYPLATE PLAN?  · Enjoy your food, but eat less.    · Avoid oversized portions.      ½ of your plate should include fruits and vegetables.    ¼ of your plate should be grains.    ¼ of your plate should be protein.  Grains  · Make at least half of your grains whole grains.  · For a 2,000 calorie daily food plan, eat 6 oz every day.  · 1 oz is about 1 slice bread, 1 cup cereal, or ½ cup  cooked rice, cereal, or pasta.  Vegetables  · Make half your plate fruits and vegetables.  · For a 2,000 calorie daily food plan, eat 2½ cups every day.  · 1 cup is about 1 cup raw or cooked vegetables or vegetable juice or 2 cups raw leafy greens.  Fruits  · Make half your plate fruits and vegetables.  · For a 2,000 calorie daily food plan, eat 2 cups every day.  · 1 cup is about 1 cup fruit or 100% fruit juice or ½ cup dried fruit.  Protein  · For a 2,000 calorie daily food plan, eat 5½ oz every day.  · 1 oz is about 1 oz meat, poultry, or fish, ¼ cup cooked beans, 1 egg, 1 Tbsp peanut butter, or ½ oz nuts or seeds.  Dairy  · Switch to fat-free or low-fat (1%) milk.  · For a 2,000 calorie daily food plan, eat 3 cups every day.  · 1 cup is about 1 cup milk or yogurt or soy milk (soy beverage), 1½ oz natural cheese, or 2 oz processed cheese.  Fats, Oils, and Empty Calories  · Only small amounts of oils are recommended.  · Empty calories are calories from solid fats or added sugars.  · Compare sodium in foods like soup, bread, and frozen meals. Choose the foods with lower numbers.  · Drink water instead of sugary drinks.  WHAT FOODS CAN I EAT?  Grains  Whole grains such as whole wheat, quinoa, millet, and bulgur. Bread, rolls, and pasta made from whole grains. Brown or wild rice. Hot or cold cereals made from whole grains and without added sugar.  Vegetables  All fresh vegetables, especially fresh red, dark green, or orange vegetables. Peas and beans. Low-sodium frozen or canned vegetables prepared without added salt. Low-sodium vegetable juices.  Fruits  All fresh, frozen, and dried fruits. Canned fruit packed in water or fruit juice without added sugar. Fruit juices without added sugar.  Meats and Other Protein Sources  Boiled, baked, or grilled lean meat trimmed of fat. Skinless poultry. Fresh seafood and shellfish. Canned seafood packed in water. Unsalted nuts and unsalted nut butters. Tofu. Dried beans and pea.  Eggs.  Dairy  Low-fat or fat-free milk, yogurt, and cheeses.   Sweets and Desserts  Frozen desserts made from low-fat milk.  Fats and Oils  Olive, peanut, and canola oils and margarine. Salad dressing and mayonnaise made from these oils.  Other  Soups and casseroles made from allowed ingredients and without added fat or salt.  The items listed above may not be a complete list of recommended foods or beverages. Contact your dietitian for more options.   WHAT FOODS ARE NOT RECOMMENDED?  Grains  Sweetened, low-fiber cereals. Packaged baked goods. Snack crackers and chips. Cheese crackers, butter crackers, and biscuits. Frozen waffles, sweet breads, doughnuts, pastries, packaged baking mixes, pancakes, cakes, and cookies.  Vegetables  Regular canned or frozen vegetables or vegetables prepared with salt. Canned tomatoes. Canned tomato sauce. Fried vegetables. Vegetables in cream sauce or cheese sauce.  Fruits  Fruits packed in syrup or made with added sugar.   Meats and Other Protein Sources  Marbled or fatty meats such as ribs. Poultry with skin. Fried meats, poultry, eggs, or fish. Sausages, hot dogs, and deli meats such as pastrami, bologna, or salami.  Dairy  Whole milk, cream, cheeses made from whole milk, sour cream. Ice cream or yogurt made from whole milk or with added sugar.  Beverages  For adults, no more than one alcoholic drink per day. Regular soft drinks or other sugary beverages. Juice drinks.  Sweets and Desserts  Sugary or fatty desserts, candy, and other sweets.  Fats and Oils  Solid shortening or partially hydrogenated oils. Solid margarine. Margarine that contains trans fats. Butter.  The items listed above may not be a complete list of foods and beverages to avoid. Contact your dietitian for more information.     This information is not intended to replace advice given to you by your health care provider. Make sure you discuss any questions you have with your health care provider.     Document  Released: 01/06/2009 Document Revised: 01/08/2016 Document Reviewed: 11/26/2014  Elsevier Interactive Patient Education ©2016 [employer]vier Inc.

## 2017-04-21 LAB
CYTOLOGIST CVX/VAG CYTO: NORMAL
CYTOLOGY CVX/VAG DOC THIN PREP: NORMAL
DX ICD CODE: NORMAL
HIV 1 & 2 AB SER-IMP: NORMAL
HPV I/H RISK 1 DNA CVX QL PROBE+SIG AMP: NEGATIVE
OTHER STN SPEC: NORMAL
PATH REPORT.FINAL DX SPEC: NORMAL
STAT OF ADQ CVX/VAG CYTO-IMP: NORMAL

## 2017-04-23 NOTE — PROGRESS NOTES
Your Pap smear results are normal.  Our next test should be in 3 years.  Yearly breast examinations and mammograms are advisable.  Dr. Sánchez

## 2017-07-13 ENCOUNTER — TELEPHONE (OUTPATIENT)
Dept: FAMILY MEDICINE CLINIC | Facility: CLINIC | Age: 52
End: 2017-07-13

## 2017-07-13 DIAGNOSIS — Z12.39 SCREENING FOR BREAST CANCER: Primary | ICD-10-CM

## 2017-07-13 NOTE — TELEPHONE ENCOUNTER
----- Message from Hunter Sánchez MD sent at 7/13/2017 10:15 AM EDT -----  Regarding: FW: PT MAMMO ORDER  Ewa, I am not sure how to do this. Lets get the schedulers in on this to change the venue. I am ok in changing it. Thanks. Dr. Sánchez  ----- Message -----     From: Ewa Perkins LPN     Sent: 7/13/2017  10:01 AM       To: Hunter Sánchez MD  Subject: FW: PT MAMMO ORDER                                   ----- Message -----     From: Kecia Akbar     Sent: 7/13/2017   9:36 AM       To: Ewa Perkins LPN  Subject: PT MAMMO ORDER                                   I called this pt today about another matter. She informed me that she has moved to [place]. She has requested for her mammo order be sent to [facility] in [place]. Can you get that done for her?

## 2017-08-02 ENCOUNTER — APPOINTMENT (OUTPATIENT)
Dept: OTHER | Facility: HOSPITAL | Age: 52
End: 2017-08-02

## 2017-08-02 ENCOUNTER — HOSPITAL ENCOUNTER (OUTPATIENT)
Dept: MAMMOGRAPHY | Facility: HOSPITAL | Age: 52
Discharge: HOME | End: 2017-08-02
Admitting: FAMILY MEDICINE

## 2017-08-02 DIAGNOSIS — Z92.89 HX OF MAMMOGRAM: ICD-10-CM

## 2017-08-02 DIAGNOSIS — Z12.39 SCREENING FOR BREAST CANCER: ICD-10-CM

## 2017-08-02 PROCEDURE — 77063 BREAST TOMOSYNTHESIS BI: CPT | Performed by: RADIOLOGY

## 2017-08-02 PROCEDURE — G0202 SCR MAMMO BI INCL CAD: HCPCS

## 2017-08-02 PROCEDURE — 77067 SCR MAMMO BI INCL CAD: CPT | Performed by: RADIOLOGY

## 2017-08-02 PROCEDURE — 77063 BREAST TOMOSYNTHESIS BI: CPT

## 2017-08-05 ENCOUNTER — RESULTS ENCOUNTER (OUTPATIENT)
Dept: FAMILY MEDICINE CLINIC | Facility: CLINIC | Age: 52
End: 2017-08-05

## 2017-08-05 DIAGNOSIS — I10 ESSENTIAL HYPERTENSION: ICD-10-CM

## 2017-08-05 DIAGNOSIS — IMO0001 UNCONTROLLED TYPE 2 DIABETES MELLITUS WITHOUT COMPLICATION, WITHOUT LONG-TERM CURRENT USE OF INSULIN: ICD-10-CM

## 2017-08-05 DIAGNOSIS — E78.49 OTHER HYPERLIPIDEMIA: ICD-10-CM

## 2017-09-06 ENCOUNTER — OFFICE VISIT (OUTPATIENT)
Dept: FAMILY MEDICINE CLINIC | Facility: CLINIC | Age: 52
End: 2017-09-06

## 2017-09-06 VITALS
WEIGHT: 187 LBS | HEIGHT: 62 IN | DIASTOLIC BLOOD PRESSURE: 84 MMHG | TEMPERATURE: 97.7 F | HEART RATE: 67 BPM | RESPIRATION RATE: 16 BRPM | SYSTOLIC BLOOD PRESSURE: 126 MMHG | BODY MASS INDEX: 34.41 KG/M2

## 2017-09-06 DIAGNOSIS — I10 ESSENTIAL HYPERTENSION: Primary | ICD-10-CM

## 2017-09-06 DIAGNOSIS — K21.9 GASTROESOPHAGEAL REFLUX DISEASE, ESOPHAGITIS PRESENCE NOT SPECIFIED: ICD-10-CM

## 2017-09-06 DIAGNOSIS — Z11.59 NEED FOR HEPATITIS C SCREENING TEST: ICD-10-CM

## 2017-09-06 DIAGNOSIS — IMO0001 UNCONTROLLED TYPE 2 DIABETES MELLITUS WITHOUT COMPLICATION, WITHOUT LONG-TERM CURRENT USE OF INSULIN: ICD-10-CM

## 2017-09-06 DIAGNOSIS — E78.49 OTHER HYPERLIPIDEMIA: ICD-10-CM

## 2017-09-06 DIAGNOSIS — Z23 IMMUNIZATION DUE: ICD-10-CM

## 2017-09-06 DIAGNOSIS — F41.9 ANXIETY: ICD-10-CM

## 2017-09-06 PROCEDURE — 90732 PPSV23 VACC 2 YRS+ SUBQ/IM: CPT | Performed by: FAMILY MEDICINE

## 2017-09-06 PROCEDURE — 90686 IIV4 VACC NO PRSV 0.5 ML IM: CPT | Performed by: FAMILY MEDICINE

## 2017-09-06 PROCEDURE — 90472 IMMUNIZATION ADMIN EACH ADD: CPT | Performed by: FAMILY MEDICINE

## 2017-09-06 PROCEDURE — 99214 OFFICE O/P EST MOD 30 MIN: CPT | Performed by: FAMILY MEDICINE

## 2017-09-06 PROCEDURE — 90471 IMMUNIZATION ADMIN: CPT | Performed by: FAMILY MEDICINE

## 2017-09-06 RX ORDER — AMLODIPINE BESYLATE 5 MG/1
5 TABLET ORAL DAILY
Qty: 30 TABLET | Refills: 5 | Status: SHIPPED | OUTPATIENT
Start: 2017-09-06 | End: 2018-03-26 | Stop reason: SDUPTHER

## 2017-09-06 RX ORDER — ATORVASTATIN CALCIUM 20 MG/1
20 TABLET, FILM COATED ORAL NIGHTLY
Qty: 30 TABLET | Refills: 5 | Status: SHIPPED | OUTPATIENT
Start: 2017-09-06 | End: 2018-03-26 | Stop reason: SDUPTHER

## 2017-09-06 RX ORDER — HYDROCHLOROTHIAZIDE 25 MG/1
25 TABLET ORAL DAILY
Qty: 30 TABLET | Refills: 5 | Status: SHIPPED | OUTPATIENT
Start: 2017-09-06 | End: 2018-03-26 | Stop reason: SDUPTHER

## 2017-09-06 RX ORDER — CARVEDILOL 12.5 MG/1
12.5 TABLET ORAL 2 TIMES DAILY WITH MEALS
Qty: 60 TABLET | Refills: 5 | Status: SHIPPED | OUTPATIENT
Start: 2017-09-06 | End: 2018-03-26 | Stop reason: SDUPTHER

## 2017-09-06 RX ORDER — OMEPRAZOLE 40 MG/1
40 CAPSULE, DELAYED RELEASE ORAL NIGHTLY
Qty: 30 CAPSULE | Refills: 5 | Status: SHIPPED | OUTPATIENT
Start: 2017-09-06 | End: 2018-03-26 | Stop reason: SDUPTHER

## 2017-09-06 RX ORDER — METFORMIN HYDROCHLORIDE 500 MG/1
1000 TABLET, EXTENDED RELEASE ORAL
Qty: 60 TABLET | Refills: 5 | Status: SHIPPED | OUTPATIENT
Start: 2017-09-06 | End: 2018-03-26 | Stop reason: SDUPTHER

## 2017-09-06 NOTE — PROGRESS NOTES
"Chief Complaint   Patient presents with   • Hypertension   • Hyperlipidemia   • Diabetes     foot exam due       Subjective   This patient presents the office to refill medicines.  Labs are due.  Blood pressure is to goal on current therapy.  GERD symptoms are controlled with omeprazole.  Lipids are stable pending labs.  Diabetes is stable pending labs.  Anxiety issues are improved due to her change in life situation.  She is near to her family living in [place] and this helps with caregiver stress.  Currently she is in the same working situation but working from home.  She is due for immunization update.  I have reviewed and updated her medications, medical history and problem list during today's office visit.        Social History   Substance Use Topics   • Smoking status: Never Smoker   • Smokeless tobacco: Never Used   • Alcohol use Yes       Review of Systems   Constitutional: Negative for fatigue.   Cardiovascular: Negative for chest pain.       Objective   /84  Pulse 67  Temp 97.7 °F (36.5 °C) (Oral)   Resp 16  Ht 62\" (157.5 cm)  Wt 187 lb (84.8 kg)  BMI 34.2 kg/m2  Body mass index is 34.2 kg/(m^2).  Physical Exam   Constitutional: She is cooperative. No distress.   Eyes: Conjunctivae and lids are normal.   Neck: Carotid bruit is not present. No tracheal deviation present.   Cardiovascular: Normal rate, regular rhythm and normal heart sounds.    No murmur heard.  Pulmonary/Chest: Effort normal and breath sounds normal.    Oseas had a diabetic foot exam performed (normal appearance) today.   During the foot exam she had a monofilament test performed (normal  sensation bilaterally ).    Neurological Sensory Findings -  No right ankle/foot altered proprioception and no left ankle/foot altered proprioception    Vascular Status -  Her exam exhibits right foot vasculature normal. Her exam exhibits no right foot edema. Her exam exhibits left foot vasculature normal. Her exam exhibits no left foot " edema.   Skin Integrity  -  Her right foot skin is intact.     Lcarla 's left foot skin is intact. .  Neurological: She is alert. She is not disoriented.   Skin: Skin is warm and dry.   Psychiatric: She has a normal mood and affect. Her speech is normal and behavior is normal.   Vitals reviewed.      Data Reviewed:        Assessment/Plan     Problem List Items Addressed This Visit        Cardiovascular and Mediastinum    Hyperlipidemia    Relevant Medications    atorvastatin (LIPITOR) 20 MG tablet    Other Relevant Orders    Lipid Panel With LDL/HDL Ratio    Essential hypertension - Primary    Relevant Medications    amLODIPine (NORVASC) 5 MG tablet    carvedilol (COREG) 12.5 MG tablet    hydrochlorothiazide (HYDRODIURIL) 25 MG tablet    Other Relevant Orders    Comprehensive metabolic panel    CBC and Differential    TSH       Digestive    GERD (gastroesophageal reflux disease)    Relevant Medications    omeprazole (priLOSEC) 40 MG capsule       Endocrine    Diabetes mellitus type 2, uncontrolled    Relevant Medications    metFORMIN ER (GLUCOPHAGE-XR) 500 MG 24 hr tablet    Other Relevant Orders    Comprehensive metabolic panel    CBC and Differential    Hemoglobin A1c    MicroAlbumin, Urine, Random       Other    Anxiety    Relevant Medications    sertraline (ZOLOFT) 50 MG tablet      Other Visit Diagnoses     Need for hepatitis C screening test        Relevant Orders    Hepatitis C Antibody    Immunization due        Relevant Orders    Flu Vaccine Quad PF 3YR+    Pneumococcal Polysaccharide Vaccine 23-Valent Greater Than or Equal To 1yo Subcutaneous / IM          Outpatient Encounter Prescriptions as of 9/6/2017   Medication Sig Dispense Refill   • ALPRAZolam (XANAX) 0.5 MG tablet   4   • aspirin 81 MG tablet Take 81 mg by mouth daily.     • Omega-3 Fatty Acids (FISH OIL PO) Take  by mouth.     • amLODIPine (NORVASC) 5 MG tablet Take 1 tablet by mouth Daily for 180 days. 30 tablet 5   • atorvastatin (LIPITOR) 20  MG tablet Take 1 tablet by mouth Every Night for 180 days. 30 tablet 5   • carvedilol (COREG) 12.5 MG tablet Take 1 tablet by mouth 2 (Two) Times a Day With Meals for 180 days. 60 tablet 5   • hydrochlorothiazide (HYDRODIURIL) 25 MG tablet Take 1 tablet by mouth Daily for 180 days. 30 tablet 5   • metFORMIN ER (GLUCOPHAGE-XR) 500 MG 24 hr tablet Take 2 tablets by mouth Daily With Dinner for 180 days. 60 tablet 5   • omeprazole (priLOSEC) 40 MG capsule Take 1 capsule by mouth Every Night for 180 days. 30 capsule 5   • sertraline (ZOLOFT) 50 MG tablet Take 1 tablet by mouth Daily for 180 days. 30 tablet 5   • [DISCONTINUED] amLODIPine (NORVASC) 5 MG tablet Take 1 tablet by mouth Daily for 180 days. 30 tablet 5   • [DISCONTINUED] atorvastatin (LIPITOR) 20 MG tablet Take 1 tablet by mouth Every Night for 180 days. 30 tablet 5   • [DISCONTINUED] carvedilol (COREG) 12.5 MG tablet Take 1 tablet by mouth 2 (Two) Times a Day With Meals for 180 days. 60 tablet 5   • [DISCONTINUED] hydrochlorothiazide (HYDRODIURIL) 25 MG tablet Take 1 tablet by mouth Daily for 180 days. 30 tablet 5   • [DISCONTINUED] metFORMIN ER (GLUCOPHAGE-XR) 500 MG 24 hr tablet Take 2 tablets by mouth Daily With Dinner for 180 days. 60 tablet 5   • [DISCONTINUED] omeprazole (priLOSEC) 40 MG capsule Take 1 capsule by mouth Every Night for 180 days. 30 capsule 5   • [DISCONTINUED] sertraline (ZOLOFT) 50 MG tablet Take 1 tablet by mouth Daily for 180 days. 30 tablet 5     No facility-administered encounter medications on file as of 9/6/2017.        Orders Placed This Encounter   Procedures   • Flu Vaccine Quad PF 3YR+   • Pneumococcal Polysaccharide Vaccine 23-Valent Greater Than or Equal To 3yo Subcutaneous / IM   • Hepatitis C Antibody   • Comprehensive metabolic panel     Standing Status:   Future     Standing Expiration Date:   6/3/2018   • Lipid Panel With LDL/HDL Ratio     Standing Status:   Future     Standing Expiration Date:   6/3/2018   • TSH      Standing Status:   Future     Standing Expiration Date:   6/3/2018   • Hemoglobin A1c     Standing Status:   Future     Standing Expiration Date:   6/3/2018   • MicroAlbumin, Urine, Random     Standing Status:   Future     Standing Expiration Date:   6/3/2018   • CBC and Differential     Standing Status:   Future     Standing Expiration Date:   6/3/2018     Order Specific Question:   Manual Differential     Answer:   No       Continue with current treatment plan.         F/U in 6 months

## 2017-11-01 ENCOUNTER — LAB (OUTPATIENT)
Dept: LAB | Facility: HOSPITAL | Age: 52
End: 2017-11-01

## 2017-11-01 DIAGNOSIS — Z11.59 SCREENING EXAMINATION FOR POLIOMYELITIS: Primary | ICD-10-CM

## 2017-11-01 LAB
ALBUMIN SERPL-MCNC: 4.3 G/DL (ref 3.2–4.8)
ALBUMIN/GLOB SERPL: 1.8 G/DL (ref 1.5–2.5)
ALP SERPL-CCNC: 117 U/L (ref 25–100)
ALT SERPL W P-5'-P-CCNC: 25 U/L (ref 7–40)
ANION GAP SERPL CALCULATED.3IONS-SCNC: 10 MMOL/L (ref 3–11)
AST SERPL-CCNC: 18 U/L (ref 0–33)
BASOPHILS # BLD AUTO: 0.03 10*3/MM3 (ref 0–0.2)
BASOPHILS NFR BLD AUTO: 0.4 % (ref 0–1)
BILIRUB SERPL-MCNC: 0.5 MG/DL (ref 0.3–1.2)
BUN BLD-MCNC: 9 MG/DL (ref 9–23)
BUN/CREAT SERPL: 18 (ref 7–25)
CALCIUM SPEC-SCNC: 9.4 MG/DL (ref 8.7–10.4)
CHLORIDE SERPL-SCNC: 96 MMOL/L (ref 99–109)
CHOLEST SERPL-MCNC: 113 MG/DL (ref 0–200)
CO2 SERPL-SCNC: 28 MMOL/L (ref 20–31)
CREAT BLD-MCNC: 0.5 MG/DL (ref 0.6–1.3)
DEPRECATED RDW RBC AUTO: 41.7 FL (ref 37–54)
EOSINOPHIL # BLD AUTO: 0.14 10*3/MM3 (ref 0–0.3)
EOSINOPHIL NFR BLD AUTO: 1.8 % (ref 0–3)
ERYTHROCYTE [DISTWIDTH] IN BLOOD BY AUTOMATED COUNT: 13.6 % (ref 11.3–14.5)
GFR SERPL CREATININE-BSD FRML MDRD: 129 ML/MIN/1.73
GLOBULIN UR ELPH-MCNC: 2.4 GM/DL
GLUCOSE BLD-MCNC: 92 MG/DL (ref 70–100)
HBA1C MFR BLD: 6.7 % (ref 4.8–5.6)
HCT VFR BLD AUTO: 34.8 % (ref 34.5–44)
HCV AB SER DONR QL: NORMAL
HDLC SERPL-MCNC: 42 MG/DL (ref 40–60)
HGB BLD-MCNC: 11.4 G/DL (ref 11.5–15.5)
IMM GRANULOCYTES # BLD: 0.02 10*3/MM3 (ref 0–0.03)
IMM GRANULOCYTES NFR BLD: 0.3 % (ref 0–0.6)
LDLC SERPL CALC-MCNC: 32 MG/DL (ref 0–100)
LDLC/HDLC SERPL: 0.76 {RATIO}
LYMPHOCYTES # BLD AUTO: 2.26 10*3/MM3 (ref 0.6–4.8)
LYMPHOCYTES NFR BLD AUTO: 28.3 % (ref 24–44)
MCH RBC QN AUTO: 27.7 PG (ref 27–31)
MCHC RBC AUTO-ENTMCNC: 32.8 G/DL (ref 32–36)
MCV RBC AUTO: 84.5 FL (ref 80–99)
MONOCYTES # BLD AUTO: 0.38 10*3/MM3 (ref 0–1)
MONOCYTES NFR BLD AUTO: 4.8 % (ref 0–12)
NEUTROPHILS # BLD AUTO: 5.16 10*3/MM3 (ref 1.5–8.3)
NEUTROPHILS NFR BLD AUTO: 64.4 % (ref 41–71)
PLATELET # BLD AUTO: 318 10*3/MM3 (ref 150–450)
PMV BLD AUTO: 9.6 FL (ref 6–12)
POTASSIUM BLD-SCNC: 3.6 MMOL/L (ref 3.5–5.5)
PROT SERPL-MCNC: 6.7 G/DL (ref 5.7–8.2)
RBC # BLD AUTO: 4.12 10*6/MM3 (ref 3.89–5.14)
SODIUM BLD-SCNC: 134 MMOL/L (ref 132–146)
TRIGL SERPL-MCNC: 195 MG/DL (ref 0–150)
TSH SERPL DL<=0.05 MIU/L-ACNC: 2.22 MIU/ML (ref 0.35–5.35)
VLDLC SERPL-MCNC: 39 MG/DL
WBC NRBC COR # BLD: 7.99 10*3/MM3 (ref 3.5–10.8)

## 2017-11-01 PROCEDURE — 82043 UR ALBUMIN QUANTITATIVE: CPT | Performed by: FAMILY MEDICINE

## 2017-11-01 PROCEDURE — 86803 HEPATITIS C AB TEST: CPT | Performed by: FAMILY MEDICINE

## 2017-11-01 PROCEDURE — 84443 ASSAY THYROID STIM HORMONE: CPT | Performed by: FAMILY MEDICINE

## 2017-11-01 PROCEDURE — 83036 HEMOGLOBIN GLYCOSYLATED A1C: CPT | Performed by: FAMILY MEDICINE

## 2017-11-01 PROCEDURE — 85025 COMPLETE CBC W/AUTO DIFF WBC: CPT | Performed by: FAMILY MEDICINE

## 2017-11-01 PROCEDURE — 80053 COMPREHEN METABOLIC PANEL: CPT | Performed by: FAMILY MEDICINE

## 2017-11-01 PROCEDURE — 36415 COLL VENOUS BLD VENIPUNCTURE: CPT | Performed by: FAMILY MEDICINE

## 2017-11-01 PROCEDURE — 80061 LIPID PANEL: CPT | Performed by: FAMILY MEDICINE

## 2017-11-03 LAB — MICROALBUMIN UR-MCNC: <3 UG/ML

## 2018-01-16 ENCOUNTER — TRANSCRIBE ORDERS (OUTPATIENT)
Dept: LAB | Facility: HOSPITAL | Age: 53
End: 2018-01-16

## 2018-01-16 ENCOUNTER — LAB (OUTPATIENT)
Dept: LAB | Facility: HOSPITAL | Age: 53
End: 2018-01-16

## 2018-01-16 DIAGNOSIS — M77.9 TENDONITIS: ICD-10-CM

## 2018-01-16 DIAGNOSIS — M10.9 GOUT, UNSPECIFIED CAUSE, UNSPECIFIED CHRONICITY, UNSPECIFIED SITE: Primary | ICD-10-CM

## 2018-01-16 DIAGNOSIS — M10.9 GOUT, UNSPECIFIED CAUSE, UNSPECIFIED CHRONICITY, UNSPECIFIED SITE: ICD-10-CM

## 2018-01-16 LAB
ALBUMIN SERPL-MCNC: 4.7 G/DL (ref 3.2–4.8)
ALBUMIN/GLOB SERPL: 1.7 G/DL (ref 1.5–2.5)
ALP SERPL-CCNC: 119 U/L (ref 25–100)
ALT SERPL W P-5'-P-CCNC: 29 U/L (ref 7–40)
ANION GAP SERPL CALCULATED.3IONS-SCNC: 7 MMOL/L (ref 3–11)
AST SERPL-CCNC: 21 U/L (ref 0–33)
BASOPHILS # BLD AUTO: 0.03 10*3/MM3 (ref 0–0.2)
BASOPHILS NFR BLD AUTO: 0.4 % (ref 0–1)
BILIRUB SERPL-MCNC: 0.4 MG/DL (ref 0.3–1.2)
BUN BLD-MCNC: 10 MG/DL (ref 9–23)
BUN/CREAT SERPL: 25 (ref 7–25)
CALCIUM SPEC-SCNC: 10 MG/DL (ref 8.7–10.4)
CHLORIDE SERPL-SCNC: 97 MMOL/L (ref 99–109)
CO2 SERPL-SCNC: 32 MMOL/L (ref 20–31)
CREAT BLD-MCNC: 0.4 MG/DL (ref 0.6–1.3)
CRP SERPL-MCNC: 1.32 MG/DL (ref 0–1)
DEPRECATED RDW RBC AUTO: 43.8 FL (ref 37–54)
EOSINOPHIL # BLD AUTO: 0.21 10*3/MM3 (ref 0–0.3)
EOSINOPHIL NFR BLD AUTO: 2.9 % (ref 0–3)
ERYTHROCYTE [DISTWIDTH] IN BLOOD BY AUTOMATED COUNT: 14.2 % (ref 11.3–14.5)
ERYTHROCYTE [SEDIMENTATION RATE] IN BLOOD: 74 MM/HR (ref 0–30)
GFR SERPL CREATININE-BSD FRML MDRD: >150 ML/MIN/1.73
GLOBULIN UR ELPH-MCNC: 2.8 GM/DL
GLUCOSE BLD-MCNC: 121 MG/DL (ref 70–100)
HCT VFR BLD AUTO: 38.4 % (ref 34.5–44)
HGB BLD-MCNC: 12.7 G/DL (ref 11.5–15.5)
IMM GRANULOCYTES # BLD: 0.03 10*3/MM3 (ref 0–0.03)
IMM GRANULOCYTES NFR BLD: 0.4 % (ref 0–0.6)
LYMPHOCYTES # BLD AUTO: 2.36 10*3/MM3 (ref 0.6–4.8)
LYMPHOCYTES NFR BLD AUTO: 32.3 % (ref 24–44)
MCH RBC QN AUTO: 28.3 PG (ref 27–31)
MCHC RBC AUTO-ENTMCNC: 33.1 G/DL (ref 32–36)
MCV RBC AUTO: 85.5 FL (ref 80–99)
MONOCYTES # BLD AUTO: 0.34 10*3/MM3 (ref 0–1)
MONOCYTES NFR BLD AUTO: 4.7 % (ref 0–12)
NEUTROPHILS # BLD AUTO: 4.33 10*3/MM3 (ref 1.5–8.3)
NEUTROPHILS NFR BLD AUTO: 59.3 % (ref 41–71)
PLATELET # BLD AUTO: 338 10*3/MM3 (ref 150–450)
PMV BLD AUTO: 10.3 FL (ref 6–12)
POTASSIUM BLD-SCNC: 4 MMOL/L (ref 3.5–5.5)
PROT SERPL-MCNC: 7.5 G/DL (ref 5.7–8.2)
RBC # BLD AUTO: 4.49 10*6/MM3 (ref 3.89–5.14)
SODIUM BLD-SCNC: 136 MMOL/L (ref 132–146)
URATE SERPL-MCNC: 5.8 MG/DL (ref 3.1–7.8)
WBC NRBC COR # BLD: 7.3 10*3/MM3 (ref 3.5–10.8)

## 2018-01-16 PROCEDURE — 86140 C-REACTIVE PROTEIN: CPT | Performed by: PODIATRIST

## 2018-01-16 PROCEDURE — 85025 COMPLETE CBC W/AUTO DIFF WBC: CPT

## 2018-01-16 PROCEDURE — 85652 RBC SED RATE AUTOMATED: CPT | Performed by: PODIATRIST

## 2018-01-16 PROCEDURE — 84550 ASSAY OF BLOOD/URIC ACID: CPT

## 2018-01-16 PROCEDURE — 36415 COLL VENOUS BLD VENIPUNCTURE: CPT | Performed by: PODIATRIST

## 2018-01-16 PROCEDURE — 80053 COMPREHEN METABOLIC PANEL: CPT | Performed by: PODIATRIST

## 2018-02-06 ENCOUNTER — RESULTS ENCOUNTER (OUTPATIENT)
Dept: FAMILY MEDICINE CLINIC | Facility: CLINIC | Age: 53
End: 2018-02-06

## 2018-02-06 DIAGNOSIS — E78.49 OTHER HYPERLIPIDEMIA: ICD-10-CM

## 2018-02-06 DIAGNOSIS — IMO0001 UNCONTROLLED TYPE 2 DIABETES MELLITUS WITHOUT COMPLICATION, WITHOUT LONG-TERM CURRENT USE OF INSULIN: ICD-10-CM

## 2018-02-06 DIAGNOSIS — I10 ESSENTIAL HYPERTENSION: ICD-10-CM

## 2018-03-26 ENCOUNTER — OFFICE VISIT (OUTPATIENT)
Dept: FAMILY MEDICINE CLINIC | Facility: CLINIC | Age: 53
End: 2018-03-26

## 2018-03-26 VITALS
HEART RATE: 73 BPM | TEMPERATURE: 96.9 F | RESPIRATION RATE: 16 BRPM | SYSTOLIC BLOOD PRESSURE: 126 MMHG | WEIGHT: 190 LBS | HEIGHT: 62 IN | BODY MASS INDEX: 34.96 KG/M2 | DIASTOLIC BLOOD PRESSURE: 72 MMHG

## 2018-03-26 DIAGNOSIS — E66.09 CLASS 1 OBESITY DUE TO EXCESS CALORIES WITH SERIOUS COMORBIDITY AND BODY MASS INDEX (BMI) OF 34.0 TO 34.9 IN ADULT: ICD-10-CM

## 2018-03-26 DIAGNOSIS — E11.9 TYPE 2 DIABETES MELLITUS WITHOUT COMPLICATION, WITHOUT LONG-TERM CURRENT USE OF INSULIN (HCC): ICD-10-CM

## 2018-03-26 DIAGNOSIS — F41.9 ANXIETY: Primary | ICD-10-CM

## 2018-03-26 DIAGNOSIS — K21.9 GASTROESOPHAGEAL REFLUX DISEASE WITHOUT ESOPHAGITIS: ICD-10-CM

## 2018-03-26 DIAGNOSIS — E78.49 OTHER HYPERLIPIDEMIA: ICD-10-CM

## 2018-03-26 DIAGNOSIS — I10 ESSENTIAL HYPERTENSION: ICD-10-CM

## 2018-03-26 PROBLEM — E66.811 CLASS 1 OBESITY DUE TO EXCESS CALORIES WITH SERIOUS COMORBIDITY AND BODY MASS INDEX (BMI) OF 34.0 TO 34.9 IN ADULT: Status: ACTIVE | Noted: 2018-03-26

## 2018-03-26 PROBLEM — E66.9 OBESITY (BMI 30-39.9): Status: RESOLVED | Noted: 2017-03-08 | Resolved: 2018-03-26

## 2018-03-26 PROCEDURE — 99214 OFFICE O/P EST MOD 30 MIN: CPT | Performed by: FAMILY MEDICINE

## 2018-03-26 RX ORDER — METFORMIN HYDROCHLORIDE 500 MG/1
1000 TABLET, EXTENDED RELEASE ORAL
Qty: 60 TABLET | Refills: 5 | Status: SHIPPED | OUTPATIENT
Start: 2018-03-26 | End: 2018-03-26 | Stop reason: SDUPTHER

## 2018-03-26 RX ORDER — ATORVASTATIN CALCIUM 20 MG/1
20 TABLET, FILM COATED ORAL NIGHTLY
Qty: 30 TABLET | Refills: 5 | Status: SHIPPED | OUTPATIENT
Start: 2018-03-26 | End: 2018-09-26 | Stop reason: SDUPTHER

## 2018-03-26 RX ORDER — HYDROCHLOROTHIAZIDE 25 MG/1
25 TABLET ORAL DAILY
Qty: 30 TABLET | Refills: 5 | Status: SHIPPED | OUTPATIENT
Start: 2018-03-26 | End: 2018-09-26 | Stop reason: SDUPTHER

## 2018-03-26 RX ORDER — OMEPRAZOLE 40 MG/1
40 CAPSULE, DELAYED RELEASE ORAL NIGHTLY
Qty: 30 CAPSULE | Refills: 5 | Status: SHIPPED | OUTPATIENT
Start: 2018-03-26 | End: 2018-09-26 | Stop reason: SDUPTHER

## 2018-03-26 RX ORDER — AMLODIPINE BESYLATE 5 MG/1
5 TABLET ORAL DAILY
Qty: 30 TABLET | Refills: 5 | Status: SHIPPED | OUTPATIENT
Start: 2018-03-26 | End: 2018-09-26 | Stop reason: SDUPTHER

## 2018-03-26 RX ORDER — METFORMIN HYDROCHLORIDE 500 MG/1
1000 TABLET, EXTENDED RELEASE ORAL
Qty: 60 TABLET | Refills: 5 | Status: SHIPPED | OUTPATIENT
Start: 2018-03-26 | End: 2018-09-26 | Stop reason: SDUPTHER

## 2018-03-26 RX ORDER — CARVEDILOL 12.5 MG/1
12.5 TABLET ORAL 2 TIMES DAILY WITH MEALS
Qty: 60 TABLET | Refills: 5 | Status: SHIPPED | OUTPATIENT
Start: 2018-03-26 | End: 2018-09-26 | Stop reason: SDUPTHER

## 2018-03-26 NOTE — PROGRESS NOTES
"Chief Complaint   Patient presents with   • Hypertension   • Hyperlipidemia       Subjective     Lcarla presents to the office today to refill her medications. No medication side effects are reported.      I have reviewed and updated her medications, medical history and problem list during today's office visit.     Assessment/Plan   Problem List Items Addressed This Visit        Cardiovascular and Mediastinum    Other hyperlipidemia    Relevant Medications    atorvastatin (LIPITOR) 20 MG tablet    Essential hypertension    Relevant Medications    amLODIPine (NORVASC) 5 MG tablet    carvedilol (COREG) 12.5 MG tablet    hydrochlorothiazide (HYDRODIURIL) 25 MG tablet       Digestive    Gastroesophageal reflux disease without esophagitis    Relevant Medications    omeprazole (priLOSEC) 40 MG capsule    Class 1 obesity due to excess calories with serious comorbidity and body mass index (BMI) of 34.0 to 34.9 in adult       Endocrine    Type 2 diabetes mellitus without complication, without long-term current use of insulin    Relevant Medications    metFORMIN ER (GLUCOPHAGE-XR) 500 MG 24 hr tablet       Other    Anxiety - Primary    Relevant Medications    sertraline (ZOLOFT) 50 MG tablet      Other Visit Diagnoses    None.       F/U in 6 months       Review of Systems   Constitutional: Negative for fatigue.   Cardiovascular: Negative for chest pain.     Objective   /72 (BP Location: Right arm, Patient Position: Sitting, Cuff Size: Adult)   Pulse 73   Temp 96.9 °F (36.1 °C) (Oral)   Resp 16   Ht 157.5 cm (62\")   Wt 86.2 kg (190 lb)   BMI 34.75 kg/m²   Body mass index is 34.75 kg/m².  Physical Exam   Constitutional: She is cooperative. No distress.   Eyes: Conjunctivae and lids are normal.   Neck: Carotid bruit is not present. No tracheal deviation present.   Cardiovascular: Normal rate, regular rhythm and normal heart sounds.    No murmur heard.  Pulmonary/Chest: Effort normal and breath sounds normal. "   Neurological: She is alert. She is not disoriented.   Skin: Skin is warm and dry.   Psychiatric: She has a normal mood and affect. Her speech is normal and behavior is normal.   Vitals reviewed.       Social History   Substance Use Topics   • Smoking status: Never Smoker   • Smokeless tobacco: Never Used   • Alcohol use Yes       Data Reviewed:             No orders of the defined types were placed in this encounter.

## 2018-03-26 NOTE — PATIENT INSTRUCTIONS
Obesity, Adult  Obesity is the condition of having too much total body fat. Being overweight or obese means that your weight is greater than what is considered healthy for your body size. Obesity is determined by a measurement called BMI. BMI is an estimate of body fat and is calculated from height and weight. For adults, a BMI of 30 or higher is considered obese.  Obesity can eventually lead to other health concerns and major illnesses, including:  · Stroke.  · Coronary artery disease (CAD).  · Type 2 diabetes.  · Some types of cancer, including cancers of the colon, breast, uterus, and gallbladder.  · Osteoarthritis.  · High blood pressure (hypertension).  · High cholesterol.  · Sleep apnea.  · Gallbladder stones.  · Infertility problems.  What are the causes?  The main cause of obesity is taking in (consuming) more calories than your body uses for energy. Other factors that contribute to this condition may include:  · Being born with genes that make you more likely to become obese.  · Having a medical condition that causes obesity. These conditions include:  ¨ Hypothyroidism.  ¨ Polycystic ovarian syndrome (PCOS).  ¨ Binge-eating disorder.  ¨ Cushing syndrome.  · Taking certain medicines, such as steroids, antidepressants, and seizure medicines.  · Not being physically active (sedentary lifestyle).  · Living where there are limited places to exercise safely or buy healthy foods.  · Not getting enough sleep.  What increases the risk?  The following factors may increase your risk of this condition:  · Having a family history of obesity.  · Being a woman of -American descent.  · Being a man of  descent.  What are the signs or symptoms?  Having excessive body fat is the main symptom of this condition.  How is this diagnosed?  This condition may be diagnosed based on:  · Your symptoms.  · Your medical history.  · A physical exam. Your health care provider may measure:  ¨ Your BMI. If you are an  adult with a BMI between 25 and less than 30, you are considered overweight. If you are an adult with a BMI of 30 or higher, you are considered obese.  ¨ The distances around your hips and your waist (circumferences). These may be compared to each other to help diagnose your condition.  ¨ Your skinfold thickness. Your health care provider may gently pinch a fold of your skin and measure it.  How is this treated?  Treatment for this condition often includes changing your lifestyle. Treatment may include some or all of the following:  · Dietary changes. Work with your health care provider and a dietitian to set a weight-loss goal that is healthy and reasonable for you. Dietary changes may include eating:  ¨ Smaller portions. A portion size is the amount of a particular food that is healthy for you to eat at one time. This varies from person to person.  ¨ Low-calorie or low-fat options.  ¨ More whole grains, fruits, and vegetables.  · Regular physical activity. This may include aerobic activity (cardio) and strength training.  · Medicine to help you lose weight. Your health care provider may prescribe medicine if you are unable to lose 1 pound a week after 6 weeks of eating more healthily and doing more physical activity.  · Surgery. Surgical options may include gastric banding and gastric bypass. Surgery may be done if:  ¨ Other treatments have not helped to improve your condition.  ¨ You have a BMI of 40 or higher.  ¨ You have life-threatening health problems related to obesity.  Follow these instructions at home:     Eating and drinking     · Follow recommendations from your health care provider about what you eat and drink. Your health care provider may advise you to:  ¨ Limit fast foods, sweets, and processed snack foods.  ¨ Choose low-fat options, such as low-fat milk instead of whole milk.  ¨ Eat 5 or more servings of fruits or vegetables every day.  ¨ Eat at home more often. This gives you more control over  what you eat.  ¨ Choose healthy foods when you eat out.  ¨ Learn what a healthy portion size is.  ¨ Keep low-fat snacks on hand.  ¨ Avoid sugary drinks, such as soda, fruit juice, iced tea sweetened with sugar, and flavored milk.  ¨ Eat a healthy breakfast.  · Drink enough water to keep your urine clear or pale yellow.  · Do not go without eating for long periods of time (do not fast) or follow a fad diet. Fasting and fad diets can be unhealthy and even dangerous.  Physical Activity   · Exercise regularly, as told by your health care provider. Ask your health care provider what types of exercise are safe for you and how often you should exercise.  · Warm up and stretch before being active.  · Cool down and stretch after being active.  · Rest between periods of activity.  Lifestyle   · Limit the time that you spend in front of your TV, computer, or video game system.  · Find ways to reward yourself that do not involve food.  · Limit alcohol intake to no more than 1 drink a day for nonpregnant women and 2 drinks a day for men. One drink equals 12 oz of beer, 5 oz of wine, or 1½ oz of hard liquor.  General instructions   · Keep a weight loss journal to keep track of the food you eat and how much you exercise you get.  · Take over-the-counter and prescription medicines only as told by your health care provider.  · Take vitamins and supplements only as told by your health care provider.  · Consider joining a support group. Your health care provider may be able to recommend a support group.  · Keep all follow-up visits as told by your health care provider. This is important.  Contact a health care provider if:  · You are unable to meet your weight loss goal after 6 weeks of dietary and lifestyle changes.  This information is not intended to replace advice given to you by your health care provider. Make sure you discuss any questions you have with your health care provider.  Document Released: 01/25/2006 Document Revised:  05/22/2017 Document Reviewed: 10/05/2016  [employer] Interactive Patient Education © 2017 [employer]      Exercising to Lose Weight  Exercising can help you to lose weight. In order to lose weight through exercise, you need to do vigorous-intensity exercise. You can tell that you are exercising with vigorous intensity if you are breathing very hard and fast and cannot hold a conversation while exercising.  Moderate-intensity exercise helps to maintain your current weight. You can tell that you are exercising at a moderate level if you have a higher heart rate and faster breathing, but you are still able to hold a conversation.  How often should I exercise?  Choose an activity that you enjoy and set realistic goals. Your health care provider can help you to make an activity plan that works for you. Exercise regularly as directed by your health care provider. This may include:  · Doing resistance training twice each week, such as:  ¨ Push-ups.  ¨ Sit-ups.  ¨ Lifting weights.  ¨ Using resistance bands.  · Doing a given intensity of exercise for a given amount of time. Choose from these options:  ¨ 150 minutes of moderate-intensity exercise every week.  ¨ 75 minutes of vigorous-intensity exercise every week.  ¨ A mix of moderate-intensity and vigorous-intensity exercise every week.  Children, pregnant women, people who are out of shape, people who are overweight, and older adults may need to consult a health care provider for individual recommendations. If you have any sort of medical condition, be sure to consult your health care provider before starting a new exercise program.  What are some activities that can help me to lose weight?  · Walking at a rate of at least 4.5 miles an hour.  · Jogging or running at a rate of 5 miles per hour.  · Biking at a rate of at least 10 miles per hour.  · Lap swimming.  · Roller-skating or in-line skating.  · Cross-country skiing.  · Vigorous competitive sports, such as football,  basketball, and soccer.  · Jumping rope.  · Aerobic dancing.  How can I be more active in my day-to-day activities?  · Use the stairs instead of the elevator.  · Take a walk during your lunch break.  · If you drive, park your car farther away from work or school.  · If you take public transportation, get off one stop early and walk the rest of the way.  · Make all of your phone calls while standing up and walking around.  · Get up, stretch, and walk around every 30 minutes throughout the day.  What guidelines should I follow while exercising?  · Do not exercise so much that you hurt yourself, feel dizzy, or get very short of breath.  · Consult your health care provider prior to starting a new exercise program.  · Wear comfortable clothes and shoes with good support.  · Drink plenty of water while you exercise to prevent dehydration or heat stroke. Body water is lost during exercise and must be replaced.  · Work out until you breathe faster and your heart beats faster.  This information is not intended to replace advice given to you by your health care provider. Make sure you discuss any questions you have with your health care provider.  Document Released: 01/20/2012 Document Revised: 05/25/2017 Document Reviewed: 05/21/2015  [employer] Interactive Patient Education © 2017 [employer]      Calorie Counting for Weight Loss  Calories are units of energy. Your body needs a certain amount of calories from food to keep you going throughout the day. When you eat more calories than your body needs, your body stores the extra calories as fat. When you eat fewer calories than your body needs, your body burns fat to get the energy it needs.  Calorie counting means keeping track of how many calories you eat and drink each day. Calorie counting can be helpful if you need to lose weight. If you make sure to eat fewer calories than your body needs, you should lose weight. Ask your health care provider what a healthy weight is for  you.  For calorie counting to work, you will need to eat the right number of calories in a day in order to lose a healthy amount of weight per week. A dietitian can help you determine how many calories you need in a day and will give you suggestions on how to reach your calorie goal.  · A healthy amount of weight to lose per week is usually 1-2 lb (0.5-0.9 kg). This usually means that your daily calorie intake should be reduced by 500-750 calories.  · Eating 1,200 - 1,500 calories per day can help most women lose weight.  · Eating 1,500 - 1,800 calories per day can help most men lose weight.  What is my plan?  My goal is to have __________ calories per day.  If I have this many calories per day, I should lose around __________ pounds per week.  What do I need to know about calorie counting?  In order to meet your daily calorie goal, you will need to:  · Find out how many calories are in each food you would like to eat. Try to do this before you eat.  · Decide how much of the food you plan to eat.  · Write down what you ate and how many calories it had. Doing this is called keeping a food log.  To successfully lose weight, it is important to balance calorie counting with a healthy lifestyle that includes regular activity. Aim for 150 minutes of moderate exercise (such as walking) or 75 minutes of vigorous exercise (such as running) each week.  Where do I find calorie information?     The number of calories in a food can be found on a Nutrition Facts label. If a food does not have a Nutrition Facts label, try to look up the calories online or ask your dietitian for help.  Remember that calories are listed per serving. If you choose to have more than one serving of a food, you will have to multiply the calories per serving by the amount of servings you plan to eat. For example, the label on a package of bread might say that a serving size is 1 slice and that there are 90 calories in a serving. If you eat 1 slice, you  "will have eaten 90 calories. If you eat 2 slices, you will have eaten 180 calories.  How do I keep a food log?  Immediately after each meal, record the following information in your food log:  · What you ate. Don't forget to include toppings, sauces, and other extras on the food.  · How much you ate. This can be measured in cups, ounces, or number of items.  · How many calories each food and drink had.  · The total number of calories in the meal.  Keep your food log near you, such as in a small notebook in your pocket, or use a mobile abdi or website. Some programs will calculate calories for you and show you how many calories you have left for the day to meet your goal.  What are some calorie counting tips?  · Use your calories on foods and drinks that will fill you up and not leave you hungry:  ¨ Some examples of foods that fill you up are nuts and nut butters, vegetables, lean proteins, and high-fiber foods like whole grains. High-fiber foods are foods with more than 5 g fiber per serving.  ¨ Drinks such as sodas, specialty coffee drinks, alcohol, and juices have a lot of calories, yet do not fill you up.  · Eat nutritious foods and avoid empty calories. Empty calories are calories you get from foods or beverages that do not have many vitamins or protein, such as candy, sweets, and soda. It is better to have a nutritious high-calorie food (such as an avocado) than a food with few nutrients (such as a bag of chips).  · Know how many calories are in the foods you eat most often. This will help you calculate calorie counts faster.  · Pay attention to calories in drinks. Low-calorie drinks include water and unsweetened drinks.  · Pay attention to nutrition labels for \"low fat\" or \"fat free\" foods. These foods sometimes have the same amount of calories or more calories than the full fat versions. They also often have added sugar, starch, or salt, to make up for flavor that was removed with the fat.  · Find a way of " tracking calories that works for you. Get creative. Try different apps or programs if writing down calories does not work for you.  What are some portion control tips?  · Know how many calories are in a serving. This will help you know how many servings of a certain food you can have.  · Use a measuring cup to measure serving sizes. You could also try weighing out portions on a kitchen scale. With time, you will be able to estimate serving sizes for some foods.  · Take some time to put servings of different foods on your favorite plates, bowls, and cups so you know what a serving looks like.  · Try not to eat straight from a bag or box. Doing this can lead to overeating. Put the amount you would like to eat in a cup or on a plate to make sure you are eating the right portion.  · Use smaller plates, glasses, and bowls to prevent overeating.  · Try not to multitask (for example, watch TV or use your computer) while eating. If it is time to eat, sit down at a table and enjoy your food. This will help you to know when you are full. It will also help you to be aware of what you are eating and how much you are eating.  What are tips for following this plan?  Reading food labels   · Check the calorie count compared to the serving size. The serving size may be smaller than what you are used to eating.  · Check the source of the calories. Make sure the food you are eating is high in vitamins and protein and low in saturated and trans fats.  Shopping   · Read nutrition labels while you shop. This will help you make healthy decisions before you decide to purchase your food.  · Make a grocery list and stick to it.  Cooking   · Try to cook your favorite foods in a healthier way. For example, try baking instead of frying.  · Use low-fat dairy products.  Meal planning   · Use more fruits and vegetables. Half of your plate should be fruits and vegetables.  · Include lean proteins like poultry and fish.  How do I count calories when  eating out?  · Ask for smaller portion sizes.  · Consider sharing an entree and sides instead of getting your own entree.  · If you get your own entree, eat only half. Ask for a box at the beginning of your meal and put the rest of your entree in it so you are not tempted to eat it.  · If calories are listed on the menu, choose the lower calorie options.  · Choose dishes that include vegetables, fruits, whole grains, low-fat dairy products, and lean protein.  · Choose items that are boiled, broiled, grilled, or steamed. Stay away from items that are buttered, battered, fried, or served with cream sauce. Items labeled “crispy” are usually fried, unless stated otherwise.  · Choose water, low-fat milk, unsweetened iced tea, or other drinks without added sugar. If you want an alcoholic beverage, choose a lower calorie option such as a glass of wine or light beer.  · Ask for dressings, sauces, and syrups on the side. These are usually high in calories, so you should limit the amount you eat.  · If you want a salad, choose a garden salad and ask for grilled meats. Avoid extra toppings like baron, cheese, or fried items. Ask for the dressing on the side, or ask for olive oil and vinegar or lemon to use as dressing.  · Estimate how many servings of a food you are given. For example, a serving of cooked rice is ½ cup or about the size of half a baseball. Knowing serving sizes will help you be aware of how much food you are eating at restaurants. The list below tells you how big or small some common portion sizes are based on everyday objects:  ¨ 1 oz--4 stacked dice.  ¨ 3 oz--1 deck of cards.  ¨ 1 tsp--1 die.  ¨ 1 Tbsp--½ a ping-pong ball.  ¨ 2 Tbsp--1 ping-pong ball.  ¨ ½ cup--½ baseball.  ¨ 1 cup--1 baseball.  Summary  · Calorie counting means keeping track of how many calories you eat and drink each day. If you eat fewer calories than your body needs, you should lose weight.  · A healthy amount of weight to lose per week  is usually 1-2 lb (0.5-0.9 kg). This usually means reducing your daily calorie intake by 500-750 calories.  · The number of calories in a food can be found on a Nutrition Facts label. If a food does not have a Nutrition Facts label, try to look up the calories online or ask your dietitian for help.  · Use your calories on foods and drinks that will fill you up, and not on foods and drinks that will leave you hungry.  · Use smaller plates, glasses, and bowls to prevent overeating.  This information is not intended to replace advice given to you by your health care provider. Make sure you discuss any questions you have with your health care provider.  Document Released: 12/18/2006 Document Revised: 11/17/2017 Document Reviewed: 11/17/2017  [employer] Interactive Patient Education © 2017 [employer]

## 2018-05-16 ENCOUNTER — APPOINTMENT (OUTPATIENT)
Dept: GENERAL RADIOLOGY | Facility: HOSPITAL | Age: 53
End: 2018-05-16

## 2018-05-16 ENCOUNTER — HOSPITAL ENCOUNTER (EMERGENCY)
Facility: HOSPITAL | Age: 53
Discharge: HOME | End: 2018-05-16
Attending: EMERGENCY MEDICINE | Admitting: EMERGENCY MEDICINE

## 2018-05-16 VITALS
RESPIRATION RATE: 16 BRPM | OXYGEN SATURATION: 97 % | HEIGHT: 62 IN | SYSTOLIC BLOOD PRESSURE: 151 MMHG | TEMPERATURE: 97.8 F | DIASTOLIC BLOOD PRESSURE: 77 MMHG | HEART RATE: 93 BPM | BODY MASS INDEX: 34.96 KG/M2 | WEIGHT: 190 LBS

## 2018-05-16 DIAGNOSIS — M25.532 WRIST PAIN, ACUTE, LEFT: Primary | ICD-10-CM

## 2018-05-16 PROCEDURE — 99283 EMERGENCY DEPT VISIT LOW MDM: CPT

## 2018-05-16 PROCEDURE — 73110 X-RAY EXAM OF WRIST: CPT

## 2018-05-16 RX ORDER — ONDANSETRON 4 MG/1
4 TABLET, ORALLY DISINTEGRATING ORAL ONCE
Status: COMPLETED | OUTPATIENT
Start: 2018-05-16 | End: 2018-05-16

## 2018-05-16 RX ORDER — NAPROXEN 250 MG/1
500 TABLET ORAL ONCE
Status: COMPLETED | OUTPATIENT
Start: 2018-05-16 | End: 2018-05-16

## 2018-05-16 RX ADMIN — NAPROXEN 500 MG: 250 TABLET ORAL at 07:32

## 2018-05-16 RX ADMIN — ONDANSETRON 4 MG: 4 TABLET, ORALLY DISINTEGRATING ORAL at 07:45

## 2018-05-16 NOTE — ED PROVIDER NOTES
Subjective   52-year-old female presents complaining of left wrist pain.  The patient is right-handed.  She states that yesterday she wore a smart watch that she describes as quite heavy and woke up this morning with significant pain in her left wrist that she attributes to the heavy watch.  Additionally, she does a lot of repetitive motion and typing at work and believes that this may be a contributing factor.  She denies any injury or trauma.  No paresthesias.  Her pain is currently 8 out of 10 in severity and worse with palpation and movement.            Review of Systems   Musculoskeletal: Positive for arthralgias.   All other systems reviewed and are negative.      Past Medical History:   Diagnosis Date   • Anemia    • Anxiety    • Bursitis, knee     acute right   • Chest pain     non cardiac   • Colonic polyp    • Essential hypertension    • GERD (gastroesophageal reflux disease)    • Hyperlipidemia    • IFG (impaired fasting glucose)    • Neoplasm of uncertain behavior of skin     left face   • Ovarian cyst    • Screening for breast cancer 10/14/2008   • Screening for colon cancer 10/14/2008   • Visit for routine gyn exam        Allergies   Allergen Reactions   • Iodinated Diagnostic Agents Hives   • Zithromax [Azithromycin] Palpitations       Past Surgical History:   Procedure Laterality Date   • CHOLECYSTECTOMY      ~ 2000   • KNEE SURGERY         Family History   Problem Relation Age of Onset   • Heart disease Father    • Cancer Father 77        lung   • Heart disease Paternal Grandmother    • Ovarian cancer Cousin 36   • Breast cancer Neg Hx        Social History     Social History   • Marital status: Single     Social History Main Topics   • Smoking status: Never Smoker   • Smokeless tobacco: Never Used   • Alcohol use Yes   • Drug use: No     Other Topics Concern   • Not on file           Objective   Physical Exam   Constitutional: She is oriented to person, place, and time. She appears well-developed  and well-nourished. No distress.   Well-appearing female in no acute distress   Musculoskeletal: Normal range of motion. She exhibits tenderness. She exhibits no edema or deformity.   Mild tenderness noted to distal aspect of left forearm, + Tinel's sign, no gross deformity present, no snuffbox tenderness noted, normal  strength, neurovascularly intact distally in left upper extremity   Neurological: She is alert and oriented to person, place, and time.   Normal gait   Skin: Skin is warm and dry. Capillary refill takes less than 2 seconds. No rash noted. She is not diaphoretic. No erythema. No pallor.   Psychiatric: She has a normal mood and affect. Judgment and thought content normal.   Nursing note and vitals reviewed.      Procedures           ED Course  ED Course as of May 16 0736   Wed May 16, 2018   0723 52-year-old female presents complaining of atraumatic left wrist pain this morning.  Of note, she contributes the pain to wearing a heavy watch yesterday and repetitive motions and frequent typing at work.  On arrival to the ED, patient nontoxic appearing.  No evidence of septic joint on exam.  Neurovascularly intact.  No snuffbox tenderness noted.  Positive Tinel's sign.  Normal  strength.  We will obtain plain films to provide pain control and reassess.  [DD]   0735 X-rays negative.  Patient reassured and counseled regarding symptomatic treatment.  Patient given Velcro splint.  She was referred to orthopedics and will follow-up as needed.  Agreeable with plan and given appropriate return precautions.  [DD]      ED Course User Index  [DD] Idris Vega MD          No results found for this or any previous visit (from the past 24 hour(s)).  Note: In addition to lab results from this visit, the labs listed above may include labs taken at another facility or during a different encounter within the last 24 hours. Please correlate lab times with ED admission and discharge times for further  "clarification of the services performed during this visit.    XR Wrist 3+ View Left    (Results Pending)     Vitals:    05/16/18 0646   BP: 151/77   BP Location: Left arm   Patient Position: Sitting   Pulse: 93   Resp: 16   Temp: 97.8 °F (36.6 °C)   TempSrc: Oral   SpO2: 97%   Weight: 86.2 kg (190 lb)   Height: 157.5 cm (62\")     Medications   naproxen (NAPROSYN) tablet 500 mg (500 mg Oral Given 5/16/18 0732)     ECG/EMG Results (last 24 hours)     ** No results found for the last 24 hours. **                [facility]      Final diagnoses:   Wrist pain, acute, left            Idris Vega MD  05/16/18 0737    "

## 2018-05-30 ENCOUNTER — OFFICE VISIT (OUTPATIENT)
Dept: ORTHOPEDIC SURGERY | Facility: CLINIC | Age: 53
End: 2018-05-30

## 2018-05-30 VITALS — OXYGEN SATURATION: 95 % | WEIGHT: 187.39 LBS | BODY MASS INDEX: 34.48 KG/M2 | HEART RATE: 91 BPM | HEIGHT: 62 IN

## 2018-05-30 DIAGNOSIS — M25.532 LEFT WRIST PAIN: Primary | ICD-10-CM

## 2018-05-30 PROCEDURE — 99204 OFFICE O/P NEW MOD 45 MIN: CPT | Performed by: PHYSICIAN ASSISTANT

## 2018-05-30 RX ORDER — ALPRAZOLAM 0.5 MG/1
0.5 TABLET ORAL
COMMUNITY
Start: 2018-05-24

## 2018-05-30 NOTE — PROGRESS NOTES
[facility] [place] [facility] Note    Subjective     Patient: Oseas Brasher  : 1965    Primary Care Provider: Hunter Sánchez MD    Requesting Provider: As above    Pain of the Left Wrist      History    Chief Complaint: Left wrist pain    History of Present Illness: This is a very pleasant 52-year-old right-hand-dominant female presenting today to discuss her left wrist pain.  She reports pain began on 5/15/18 without any trauma or injury.  She woke in the middle the night with excruciating pain which persisted into the next day and went to the emergency room.  She complained of circumferential wrist pain with radiating up the forearm and some above the elbow.  She is having pain in the thumb as well as the index and long finger.  She had x-rays at the ER on 18 which showed only calcification on the volar aspect of the wrist.  She has been using a thumb spica brace as well as a sling with 90% improved pain.  She denies any numbness or tingling.  She is been taking ibuprofen.  No previous problems with the left wrist.    Current Outpatient Prescriptions on File Prior to Visit   Medication Sig Dispense Refill   • ALPRAZolam (XANAX) 0.5 MG tablet 3 (Three) Times a Day.  4   • amLODIPine (NORVASC) 5 MG tablet Take 1 tablet by mouth Daily for 180 days. 30 tablet 5   • aspirin 81 MG tablet Take 81 mg by mouth daily.     • atorvastatin (LIPITOR) 20 MG tablet Take 1 tablet by mouth Every Night for 180 days. 30 tablet 5   • carvedilol (COREG) 12.5 MG tablet Take 1 tablet by mouth 2 (Two) Times a Day With Meals for 180 days. 60 tablet 5   • hydrochlorothiazide (HYDRODIURIL) 25 MG tablet Take 1 tablet by mouth Daily for 180 days. 30 tablet 5   • metFORMIN ER (GLUCOPHAGE-XR) 500 MG 24 hr tablet Take 2 tablets by mouth Daily With Dinner for 180 days. 60 tablet 5   • Omega-3 Fatty Acids (FISH OIL PO) Take  by mouth Daily.     • omeprazole (priLOSEC) 40 MG capsule Take 1 capsule by mouth Every Night for 180  days. 30 capsule 5   • sertraline (ZOLOFT) 50 MG tablet Take 1 tablet by mouth Daily for 180 days. 30 tablet 5     No current facility-administered medications on file prior to visit.       Allergies   Allergen Reactions   • Iodinated Diagnostic Agents Hives   • Zithromax [Azithromycin] Palpitations      Past Medical History:   Diagnosis Date   • Anemia    • Anxiety    • Bursitis, knee     acute right   • Chest pain     non cardiac   • Colonic polyp    • Essential hypertension    • GERD (gastroesophageal reflux disease)    • Hyperlipidemia    • IFG (impaired fasting glucose)    • Neoplasm of uncertain behavior of skin     left face   • Ovarian cyst    • Screening for breast cancer 10/14/2008   • Screening for colon cancer 10/14/2008   • Visit for routine gyn exam      Past Surgical History:   Procedure Laterality Date   • CHOLECYSTECTOMY      ~ 2000   • KNEE SURGERY       Family History   Problem Relation Age of Onset   • Heart disease Father    • Cancer Father 77        lung   • Heart disease Paternal Grandmother    • Ovarian cancer Cousin 36   • Breast cancer Neg Hx       Social History     Social History   • Marital status: Single     Spouse name: N/A   • Number of children: N/A   • Years of education: N/A     Occupational History   • Not on file.     Social History Main Topics   • Smoking status: Never Smoker   • Smokeless tobacco: Never Used   • Alcohol use Yes   • Drug use: No   • Sexual activity: Defer     Other Topics Concern   • Not on file     Social History Narrative   • No narrative on file        Review of Systems   Constitutional: Negative.    HENT: Negative.    Eyes: Negative.    Respiratory: Negative.    Cardiovascular: Negative.    Gastrointestinal: Negative.    Endocrine: Negative.    Genitourinary: Negative.    Musculoskeletal: Positive for arthralgias.   Skin: Negative.    Allergic/Immunologic: Negative.    Neurological: Negative.    Hematological: Negative.    Psychiatric/Behavioral: Negative.   "      The following portions of the patient's history were reviewed and updated as appropriate: allergies, current medications, past family history, past medical history, past social history, past surgical history and problem list.      Objective      Physical Exam  Pulse 91   Ht 157.5 cm (62.01\")   Wt 85 kg (187 lb 6.3 oz)   SpO2 95%   BMI 34.27 kg/m²     Body mass index is 34.27 kg/m².    GENERAL: Body habitus: obese    Gait: antalgic     Mental Status:  awake and alert; oriented to person, place, and time    Voice:  clear  SKIN:  Normal    Hair Growth:  Right:normal; Left:  normal  HEENT: Head: Normocephalic, atraumatic,  without obvious abnormality.  eye: normal external eye, no icterus   PULM:  Repiratory effort normal    Ortho Exam  Peripheral Vascular   Bilateral Upper Extremity    No cyanotic nail beds    Pink nail beds and rapid capillary refill   Palpation    Radial Pulse - Bilaterally normal    Neurologic   Sensory: Light touch intact- Right and left hand    Left Upper Extremity    Left wrist extensors: 5/5    Left wrist flexors: 5/5    Left intrinsics: 5/5   Right Upper Extremity    Right wrist extensors: 5/5    Right wrist flexors: 5/5    Right intrinsics: 5/5    Musculoskeletal   Left Elbow    Forearm supination: AROM - 90 degrees    Forearm pronation: AROM - 90 degrees   Right Elbow    Forearm supination: AROM - 90 degrees    Forearm pronation: AROM - 90 degrees     Inspection and Palpation   Right Wrist      Tenderness - none    Swelling - none    Crepitus - none    Muscle tone - no atrophy   Left Wrist    Tenderness - tender over the radial styloid with mildly positive Finkelstein's    Swelling - none    Crepitus - none    Muscle tone - no atrophy     ROJM:   Left Wrist    Flexion: AROM - 90 degrees    Extension: AROM - 90 degrees   Right Wrist    Flexion: AROM - 90 degrees    Extension: AROM - 90 degrees     Deformities, Malalignments, Discrepancies    None     Functional Testing       Strength " and Tone    Right  strength: good    Left  strength: good     Hand Exam:       Full range of motion of the thumb MCPJ and IPJ bilaterally    Full range of motion of the index finger MCPJ, PIPJ and DIPJ  bilaterally    Full range of motion of the middle finger MCPJ, PIPJ and DIPJ bilaterally    Full range of motion of the ring finger MCPJ, PIPJ and DIPJ bilaterally    Full range of motion of the small finger MCPJ, PIPJ and DIPJ bialterally    FDS, FDP and extensor tendons are intact in the index, middle, ring and small fingers bilaterally    FPJ and extensor tendons are intact in the thumb.    No palpable triggering         Medical Decision Making    Data Review:   reviewed radiology images    Assessment:  1. Left wrist pain        Plan:  Left wrist pain.  I reviewed ED x-rays and clinical findings with the patient.  On exam, she is tender over the radial styloid with positive Finkelstein's test.  X-rays show no evidence of fracture or anything more worrisome.  I don't think the calcification seen on x-ray is contributing to her pain or anything to be concerned about.  I suspect her pain and functional limitations are secondary to de Quervain's tenosynovitis at this point.  We discussed treatment including continue use of the thumb spica brace as well as anti-inflammatories.  She reports she is 90% better at this time.  I would continue with the brace for another week or 2 and then discontinue.  If it continues to bother her, an injection would be the next step.  At this time, I would avoid injection.  Plan is she continue with splinting and anti-inflammatories and return to see us if pain does not resolve over the next 4-6 weeks.    Patient history, diagnosis and treatment plan discussed with Dr. Coyle.        Paulina Cosme PA-C  05/30/18  10:34 AM

## 2018-09-22 DIAGNOSIS — F41.9 ANXIETY: ICD-10-CM

## 2018-09-22 DIAGNOSIS — I10 ESSENTIAL HYPERTENSION: ICD-10-CM

## 2018-09-22 DIAGNOSIS — K21.9 GASTROESOPHAGEAL REFLUX DISEASE WITHOUT ESOPHAGITIS: ICD-10-CM

## 2018-09-22 DIAGNOSIS — E78.49 OTHER HYPERLIPIDEMIA: ICD-10-CM

## 2018-09-25 RX ORDER — HYDROCHLOROTHIAZIDE 25 MG/1
TABLET ORAL
Qty: 30 TABLET | Refills: 0 | OUTPATIENT
Start: 2018-09-25

## 2018-09-25 RX ORDER — METFORMIN HYDROCHLORIDE 500 MG/1
TABLET, EXTENDED RELEASE ORAL
Qty: 60 TABLET | Refills: 0 | OUTPATIENT
Start: 2018-09-25

## 2018-09-25 RX ORDER — OMEPRAZOLE 40 MG/1
CAPSULE, DELAYED RELEASE ORAL
Qty: 30 CAPSULE | Refills: 0 | OUTPATIENT
Start: 2018-09-25

## 2018-09-25 RX ORDER — AMLODIPINE BESYLATE 5 MG/1
TABLET ORAL
Qty: 30 TABLET | Refills: 0 | OUTPATIENT
Start: 2018-09-25

## 2018-09-25 RX ORDER — ATORVASTATIN CALCIUM 20 MG/1
TABLET, FILM COATED ORAL
Qty: 30 TABLET | Refills: 0 | OUTPATIENT
Start: 2018-09-25

## 2018-09-25 RX ORDER — CARVEDILOL 12.5 MG/1
TABLET ORAL
Qty: 60 TABLET | Refills: 0 | OUTPATIENT
Start: 2018-09-25

## 2018-09-26 ENCOUNTER — OFFICE VISIT (OUTPATIENT)
Dept: FAMILY MEDICINE CLINIC | Facility: CLINIC | Age: 53
End: 2018-09-26

## 2018-09-26 VITALS
WEIGHT: 189 LBS | RESPIRATION RATE: 16 BRPM | SYSTOLIC BLOOD PRESSURE: 144 MMHG | TEMPERATURE: 97.3 F | DIASTOLIC BLOOD PRESSURE: 82 MMHG | HEART RATE: 66 BPM | BODY MASS INDEX: 34.78 KG/M2 | HEIGHT: 62 IN

## 2018-09-26 DIAGNOSIS — F41.9 ANXIETY: ICD-10-CM

## 2018-09-26 DIAGNOSIS — K21.9 GASTROESOPHAGEAL REFLUX DISEASE WITHOUT ESOPHAGITIS: ICD-10-CM

## 2018-09-26 DIAGNOSIS — I10 ESSENTIAL HYPERTENSION: Primary | ICD-10-CM

## 2018-09-26 DIAGNOSIS — Z12.31 ENCOUNTER FOR SCREENING MAMMOGRAM FOR MALIGNANT NEOPLASM OF BREAST: ICD-10-CM

## 2018-09-26 DIAGNOSIS — E78.49 OTHER HYPERLIPIDEMIA: ICD-10-CM

## 2018-09-26 DIAGNOSIS — E11.9 TYPE 2 DIABETES MELLITUS WITHOUT COMPLICATION, WITHOUT LONG-TERM CURRENT USE OF INSULIN (HCC): ICD-10-CM

## 2018-09-26 DIAGNOSIS — Z23 NEED FOR IMMUNIZATION AGAINST INFLUENZA: ICD-10-CM

## 2018-09-26 PROCEDURE — 90674 CCIIV4 VAC NO PRSV 0.5 ML IM: CPT | Performed by: FAMILY MEDICINE

## 2018-09-26 PROCEDURE — 90471 IMMUNIZATION ADMIN: CPT | Performed by: FAMILY MEDICINE

## 2018-09-26 PROCEDURE — 99214 OFFICE O/P EST MOD 30 MIN: CPT | Performed by: FAMILY MEDICINE

## 2018-09-26 RX ORDER — CARVEDILOL 12.5 MG/1
12.5 TABLET ORAL 2 TIMES DAILY WITH MEALS
Qty: 60 TABLET | Refills: 5 | Status: SHIPPED | OUTPATIENT
Start: 2018-09-26 | End: 2018-12-05 | Stop reason: SDUPTHER

## 2018-09-26 RX ORDER — METFORMIN HYDROCHLORIDE 500 MG/1
1000 TABLET, EXTENDED RELEASE ORAL
Qty: 60 TABLET | Refills: 5 | Status: SHIPPED | OUTPATIENT
Start: 2018-09-26 | End: 2018-12-05 | Stop reason: SDUPTHER

## 2018-09-26 RX ORDER — AMLODIPINE BESYLATE 5 MG/1
5 TABLET ORAL DAILY
Qty: 30 TABLET | Refills: 5 | Status: SHIPPED | OUTPATIENT
Start: 2018-09-26 | End: 2018-12-05 | Stop reason: SDUPTHER

## 2018-09-26 RX ORDER — OMEPRAZOLE 40 MG/1
40 CAPSULE, DELAYED RELEASE ORAL NIGHTLY
Qty: 30 CAPSULE | Refills: 5 | Status: SHIPPED | OUTPATIENT
Start: 2018-09-26 | End: 2018-12-05 | Stop reason: SDUPTHER

## 2018-09-26 RX ORDER — ATORVASTATIN CALCIUM 20 MG/1
20 TABLET, FILM COATED ORAL NIGHTLY
Qty: 30 TABLET | Refills: 5 | Status: SHIPPED | OUTPATIENT
Start: 2018-09-26 | End: 2018-12-05 | Stop reason: SDUPTHER

## 2018-09-26 RX ORDER — SERTRALINE HYDROCHLORIDE 100 MG/1
100 TABLET, FILM COATED ORAL DAILY
Qty: 30 TABLET | Refills: 2 | Status: SHIPPED | OUTPATIENT
Start: 2018-09-26 | End: 2018-12-05 | Stop reason: SDUPTHER

## 2018-09-26 RX ORDER — HYDROCHLOROTHIAZIDE 25 MG/1
25 TABLET ORAL DAILY
Qty: 30 TABLET | Refills: 5 | Status: SHIPPED | OUTPATIENT
Start: 2018-09-26 | End: 2018-12-05 | Stop reason: SDUPTHER

## 2018-09-26 NOTE — PROGRESS NOTES
"Chief Complaint   Patient presents with   • Hypertension   • Hyperlipidemia   • Diabetes   • Heartburn       Subjective     Oseas Brasher presents to the office today to refill her medications. No medication side effects are reported.  Blood pressure is stable.  She has had increased stress from job loss and car repairs and other changes in her medical insurance recently.  Lipids are stable pending lab results.  GERD symptoms are stable.  Her anxiety is worsening because of her life situations.  We will increase her sertraline with short-term  follow-up.    I have reviewed and updated her medications, medical history and problem list during today's office visit.     Patient Care Team:  Hunter Sánchez MD as PCP - General (Family Medicine)    Social History   Substance Use Topics   • Smoking status: Never Smoker   • Smokeless tobacco: Never Used   • Alcohol use Yes     4 Glasses of wine, 8 Cans of beer, 2 Shots of liquor per week      Comment: varies       Review of Systems   Constitutional: Negative for fatigue.   Cardiovascular: Negative for chest pain.       Objective     /82   Pulse 66   Temp 97.3 °F (36.3 °C) (Oral)   Resp 16   Ht 157.5 cm (62.01\")   Wt 85.7 kg (189 lb)   BMI 34.56 kg/m²     Body mass index is 34.56 kg/m².    Physical Exam   Constitutional: She is oriented to person, place, and time. She appears well-developed. No distress.   Eyes: Conjunctivae and lids are normal.   Neck: Carotid bruit is not present.   Cardiovascular: Normal rate, regular rhythm and normal heart sounds.    Pulmonary/Chest: Effort normal and breath sounds normal.    Oseas had a diabetic foot exam performed today.   During the foot exam she had a monofilament test performed (normal sensation in 3 points, bilaterally).    Neurological Sensory Findings -  No right ankle/foot altered proprioception and no left ankle/foot altered proprioception  Vascular Status -  Her right foot exhibits normal foot vasculature  and no " edema. Her left foot exhibits normal foot vasculature  and no edema.  Skin Integrity  -  Her right foot skin is intact.Her left foot skin is intact..  Neurological: She is alert and oriented to person, place, and time.   Skin: Skin is warm and dry.   Psychiatric: She has a normal mood and affect. Her behavior is normal.   Vitals reviewed.      Data Reviewed:             Assessment/Plan     Problem List Items Addressed This Visit     Anxiety     Situational anxiety is worsening.  We will increase dose of sertraline to 100 mg with 6 week follow-up.         Relevant Medications    sertraline (ZOLOFT) 100 MG tablet    Gastroesophageal reflux disease without esophagitis     The current medical regimen is effective;  continue present plan and medications.           Relevant Medications    omeprazole (priLOSEC) 40 MG capsule    Other hyperlipidemia     Lipid abnormalities are unchanged.  Pharmacotherapy as ordered.  Lipids will be reassessed in 6 months.         Relevant Medications    atorvastatin (LIPITOR) 20 MG tablet    Essential hypertension - Primary     Hypertension is unchanged.  Continue current treatment regimen.  Blood pressure will be reassessed at the next regular appointment.         Relevant Medications    amLODIPine (NORVASC) 5 MG tablet    carvedilol (COREG) 12.5 MG tablet    hydrochlorothiazide (HYDRODIURIL) 25 MG tablet    Type 2 diabetes mellitus without complication, without long-term current use of insulin (CMS/[facility])     Diabetes is unchanged.   Continue current treatment regimen.  Diabetes will be reassessed in 6 months.         Relevant Medications    metFORMIN ER (GLUCOPHAGE-XR) 500 MG 24 hr tablet    Other Relevant Orders    Referral for Diabetic Eye Exam (Ophthalmology)      Other Visit Diagnoses     Need for immunization against influenza        Relevant Orders    Flucelvax Quad=>4Years (PFS)    Encounter for screening mammogram for malignant neoplasm of breast        Relevant Orders    Mammo  Screening Bilateral With CAD          Orders Placed This Encounter   Procedures   • Mammo Screening Bilateral With CAD     Scheduling Instructions:      Schedule at [facility] [place]     Order Specific Question:   Reason for Exam:     Answer:   screening   • Flucelvax Quad=>4Years (PFS)     Standing Status:   Standing     Number of Occurrences:   1         Current Outpatient Prescriptions:   •  ALPRAZolam (XANAX) 0.5 MG tablet, 3 (Three) Times a Day., Disp: , Rfl: 4  •  ALPRAZolam (XANAX) 0.5 MG tablet, Take 0.5 mg by mouth., Disp: , Rfl:   •  aspirin 81 MG tablet, Take 81 mg by mouth daily., Disp: , Rfl:   •  Omega-3 Fatty Acids (FISH OIL PO), Take  by mouth Daily., Disp: , Rfl:   •  amLODIPine (NORVASC) 5 MG tablet, Take 1 tablet by mouth Daily for 180 days., Disp: 30 tablet, Rfl: 5  •  atorvastatin (LIPITOR) 20 MG tablet, Take 1 tablet by mouth Every Night for 180 days., Disp: 30 tablet, Rfl: 5  •  carvedilol (COREG) 12.5 MG tablet, Take 1 tablet by mouth 2 (Two) Times a Day With Meals for 180 days., Disp: 60 tablet, Rfl: 5  •  hydrochlorothiazide (HYDRODIURIL) 25 MG tablet, Take 1 tablet by mouth Daily for 180 days., Disp: 30 tablet, Rfl: 5  •  metFORMIN ER (GLUCOPHAGE-XR) 500 MG 24 hr tablet, Take 2 tablets by mouth Daily With Dinner for 180 days., Disp: 60 tablet, Rfl: 5  •  omeprazole (priLOSEC) 40 MG capsule, Take 1 capsule by mouth Every Night for 180 days., Disp: 30 capsule, Rfl: 5  •  sertraline (ZOLOFT) 100 MG tablet, Take 1 tablet by mouth Daily for 90 days., Disp: 30 tablet, Rfl: 2    Return in about 6 weeks (around 11/7/2018) for Recheck.

## 2018-09-26 NOTE — ASSESSMENT & PLAN NOTE
Situational anxiety is worsening.  We will increase dose of sertraline to 100 mg with 6 week follow-up.

## 2018-10-24 ENCOUNTER — LAB (OUTPATIENT)
Dept: LAB | Facility: HOSPITAL | Age: 53
End: 2018-10-24

## 2018-10-24 DIAGNOSIS — I10 ESSENTIAL HYPERTENSION: ICD-10-CM

## 2018-10-24 DIAGNOSIS — F41.9 ANXIETY: ICD-10-CM

## 2018-10-24 DIAGNOSIS — K21.9 GASTROESOPHAGEAL REFLUX DISEASE WITHOUT ESOPHAGITIS: ICD-10-CM

## 2018-10-24 DIAGNOSIS — D64.9 ANEMIA, UNSPECIFIED TYPE: Primary | ICD-10-CM

## 2018-10-24 DIAGNOSIS — E78.49 OTHER HYPERLIPIDEMIA: ICD-10-CM

## 2018-10-24 LAB
ALBUMIN SERPL-MCNC: 4.62 G/DL (ref 3.2–4.8)
ALBUMIN/GLOB SERPL: 2.3 G/DL (ref 1.5–2.5)
ALP SERPL-CCNC: 105 U/L (ref 25–100)
ALT SERPL W P-5'-P-CCNC: 33 U/L (ref 7–40)
ANION GAP SERPL CALCULATED.3IONS-SCNC: 10 MMOL/L (ref 3–11)
ARTICHOKE IGE QN: 46 MG/DL (ref 0–130)
AST SERPL-CCNC: 28 U/L (ref 0–33)
BASOPHILS # BLD AUTO: 0.02 10*3/MM3 (ref 0–0.2)
BASOPHILS NFR BLD AUTO: 0.3 % (ref 0–1)
BILIRUB SERPL-MCNC: 0.7 MG/DL (ref 0.3–1.2)
BUN BLD-MCNC: 8 MG/DL (ref 9–23)
BUN/CREAT SERPL: 15.1 (ref 7–25)
CALCIUM SPEC-SCNC: 9.5 MG/DL (ref 8.7–10.4)
CHLORIDE SERPL-SCNC: 97 MMOL/L (ref 99–109)
CHOLEST SERPL-MCNC: 114 MG/DL (ref 0–200)
CO2 SERPL-SCNC: 28 MMOL/L (ref 20–31)
CREAT BLD-MCNC: 0.53 MG/DL (ref 0.6–1.3)
DEPRECATED RDW RBC AUTO: 44.1 FL (ref 37–54)
EOSINOPHIL # BLD AUTO: 0.11 10*3/MM3 (ref 0–0.3)
EOSINOPHIL NFR BLD AUTO: 1.7 % (ref 0–3)
ERYTHROCYTE [DISTWIDTH] IN BLOOD BY AUTOMATED COUNT: 14.3 % (ref 11.3–14.5)
GFR SERPL CREATININE-BSD FRML MDRD: 120 ML/MIN/1.73
GLOBULIN UR ELPH-MCNC: 2 GM/DL
GLUCOSE BLD-MCNC: 100 MG/DL (ref 70–100)
HBA1C MFR BLD: 6.9 % (ref 4.8–5.6)
HCT VFR BLD AUTO: 37.9 % (ref 34.5–44)
HDLC SERPL-MCNC: 40 MG/DL (ref 40–60)
HGB BLD-MCNC: 12.3 G/DL (ref 11.5–15.5)
IMM GRANULOCYTES # BLD: 0.01 10*3/MM3 (ref 0–0.03)
IMM GRANULOCYTES NFR BLD: 0.2 % (ref 0–0.6)
LYMPHOCYTES # BLD AUTO: 1.84 10*3/MM3 (ref 0.6–4.8)
LYMPHOCYTES NFR BLD AUTO: 28.3 % (ref 24–44)
MCH RBC QN AUTO: 27.5 PG (ref 27–31)
MCHC RBC AUTO-ENTMCNC: 32.5 G/DL (ref 32–36)
MCV RBC AUTO: 84.8 FL (ref 80–99)
MONOCYTES # BLD AUTO: 0.39 10*3/MM3 (ref 0–1)
MONOCYTES NFR BLD AUTO: 6 % (ref 0–12)
NEUTROPHILS # BLD AUTO: 4.15 10*3/MM3 (ref 1.5–8.3)
NEUTROPHILS NFR BLD AUTO: 63.7 % (ref 41–71)
NRBC BLD MANUAL-RTO: 0 /100 WBC (ref 0–0)
PLATELET # BLD AUTO: 319 10*3/MM3 (ref 150–450)
PMV BLD AUTO: 10.3 FL (ref 6–12)
POTASSIUM BLD-SCNC: 4 MMOL/L (ref 3.5–5.5)
PROT SERPL-MCNC: 6.6 G/DL (ref 5.7–8.2)
RBC # BLD AUTO: 4.47 10*6/MM3 (ref 3.89–5.14)
SODIUM BLD-SCNC: 135 MMOL/L (ref 132–146)
TRIGL SERPL-MCNC: 246 MG/DL (ref 0–150)
TSH SERPL DL<=0.05 MIU/L-ACNC: 2.76 MIU/ML (ref 0.35–5.35)
WBC NRBC COR # BLD: 6.51 10*3/MM3 (ref 3.5–10.8)

## 2018-10-24 PROCEDURE — 80061 LIPID PANEL: CPT

## 2018-10-24 PROCEDURE — 84443 ASSAY THYROID STIM HORMONE: CPT

## 2018-10-24 PROCEDURE — 85025 COMPLETE CBC W/AUTO DIFF WBC: CPT

## 2018-10-24 PROCEDURE — 82043 UR ALBUMIN QUANTITATIVE: CPT

## 2018-10-24 PROCEDURE — 80053 COMPREHEN METABOLIC PANEL: CPT

## 2018-10-24 PROCEDURE — 83036 HEMOGLOBIN GLYCOSYLATED A1C: CPT

## 2018-10-24 PROCEDURE — 36415 COLL VENOUS BLD VENIPUNCTURE: CPT

## 2018-10-25 LAB — MICROALBUMIN UR-MCNC: <3 UG/ML

## 2018-11-07 ENCOUNTER — HOSPITAL ENCOUNTER (OUTPATIENT)
Dept: MAMMOGRAPHY | Facility: HOSPITAL | Age: 53
Discharge: HOME | End: 2018-11-07
Admitting: FAMILY MEDICINE

## 2018-11-07 PROCEDURE — 77067 SCR MAMMO BI INCL CAD: CPT | Performed by: RADIOLOGY

## 2018-11-07 PROCEDURE — 77063 BREAST TOMOSYNTHESIS BI: CPT

## 2018-11-07 PROCEDURE — 77063 BREAST TOMOSYNTHESIS BI: CPT | Performed by: RADIOLOGY

## 2018-11-07 PROCEDURE — 77067 SCR MAMMO BI INCL CAD: CPT

## 2018-11-16 ENCOUNTER — TRANSCRIBE ORDERS (OUTPATIENT)
Dept: MAMMOGRAPHY | Facility: HOSPITAL | Age: 53
End: 2018-11-16

## 2018-11-16 ENCOUNTER — HOSPITAL ENCOUNTER (OUTPATIENT)
Dept: MAMMOGRAPHY | Facility: HOSPITAL | Age: 53
Discharge: HOME | End: 2018-11-16
Admitting: RADIOLOGY

## 2018-11-16 DIAGNOSIS — R92.8 ABNORMAL MAMMOGRAM: Primary | ICD-10-CM

## 2018-11-16 DIAGNOSIS — R92.8 ABNORMAL MAMMOGRAM: ICD-10-CM

## 2018-11-16 PROCEDURE — 77065 DX MAMMO INCL CAD UNI: CPT | Performed by: RADIOLOGY

## 2018-11-16 PROCEDURE — 77065 DX MAMMO INCL CAD UNI: CPT

## 2018-11-20 ENCOUNTER — HOSPITAL ENCOUNTER (OUTPATIENT)
Dept: MAMMOGRAPHY | Facility: HOSPITAL | Age: 53
Discharge: HOME | End: 2018-11-20
Admitting: RADIOLOGY

## 2018-11-20 ENCOUNTER — HOSPITAL ENCOUNTER (OUTPATIENT)
Dept: MAMMOGRAPHY | Facility: HOSPITAL | Age: 53
Discharge: HOME | End: 2018-11-20

## 2018-11-20 DIAGNOSIS — R92.8 ABNORMAL MAMMOGRAM: ICD-10-CM

## 2018-11-20 PROCEDURE — A4648 IMPLANTABLE TISSUE MARKER: HCPCS

## 2018-11-20 PROCEDURE — 19081 BX BREAST 1ST LESION STRTCTC: CPT | Performed by: RADIOLOGY

## 2018-11-20 PROCEDURE — 76098 X-RAY EXAM SURGICAL SPECIMEN: CPT

## 2018-11-20 PROCEDURE — 88305 TISSUE EXAM BY PATHOLOGIST: CPT | Performed by: FAMILY MEDICINE

## 2018-11-20 PROCEDURE — 77065 DX MAMMO INCL CAD UNI: CPT | Performed by: RADIOLOGY

## 2018-11-20 RX ORDER — LIDOCAINE HYDROCHLORIDE 10 MG/ML
5 INJECTION, SOLUTION INFILTRATION; PERINEURAL ONCE
Status: COMPLETED | OUTPATIENT
Start: 2018-11-20 | End: 2018-11-20

## 2018-11-20 RX ORDER — LIDOCAINE HYDROCHLORIDE AND EPINEPHRINE 10; 10 MG/ML; UG/ML
10 INJECTION, SOLUTION INFILTRATION; PERINEURAL ONCE
Status: COMPLETED | OUTPATIENT
Start: 2018-11-20 | End: 2018-11-20

## 2018-11-20 RX ADMIN — LIDOCAINE HYDROCHLORIDE 3 ML: 10 INJECTION, SOLUTION INFILTRATION; PERINEURAL at 12:01

## 2018-11-20 RX ADMIN — LIDOCAINE HYDROCHLORIDE,EPINEPHRINE BITARTRATE 2 ML: 10; .01 INJECTION, SOLUTION INFILTRATION; PERINEURAL at 12:01

## 2018-11-21 ENCOUNTER — TRANSCRIBE ORDERS (OUTPATIENT)
Dept: ADMINISTRATIVE | Facility: HOSPITAL | Age: 53
End: 2018-11-21

## 2018-11-21 DIAGNOSIS — R92.8 ABNORMAL MAMMOGRAM: Primary | ICD-10-CM

## 2018-11-21 LAB
CYTO UR: NORMAL
LAB AP CASE REPORT: NORMAL
LAB AP CLINICAL INFORMATION: NORMAL
LAB AP DIAGNOSIS COMMENT: NORMAL
PATH REPORT.FINAL DX SPEC: NORMAL
PATH REPORT.GROSS SPEC: NORMAL

## 2018-11-23 ENCOUNTER — TELEPHONE (OUTPATIENT)
Dept: MAMMOGRAPHY | Facility: HOSPITAL | Age: 53
End: 2018-11-23

## 2018-11-23 NOTE — TELEPHONE ENCOUNTER
Pt notified of pathology results and recommendations. Verbalizes understanding. Denies discomfort. Denies signs and symptoms of infection.

## 2018-12-05 ENCOUNTER — OFFICE VISIT (OUTPATIENT)
Dept: FAMILY MEDICINE CLINIC | Facility: CLINIC | Age: 53
End: 2018-12-05

## 2018-12-05 VITALS
TEMPERATURE: 97.4 F | SYSTOLIC BLOOD PRESSURE: 133 MMHG | HEIGHT: 62 IN | HEART RATE: 74 BPM | DIASTOLIC BLOOD PRESSURE: 80 MMHG | BODY MASS INDEX: 34.6 KG/M2 | WEIGHT: 188 LBS | RESPIRATION RATE: 16 BRPM

## 2018-12-05 DIAGNOSIS — F41.9 ANXIETY: ICD-10-CM

## 2018-12-05 DIAGNOSIS — E78.49 OTHER HYPERLIPIDEMIA: ICD-10-CM

## 2018-12-05 DIAGNOSIS — K21.9 GASTROESOPHAGEAL REFLUX DISEASE WITHOUT ESOPHAGITIS: ICD-10-CM

## 2018-12-05 DIAGNOSIS — I10 ESSENTIAL HYPERTENSION: Primary | ICD-10-CM

## 2018-12-05 DIAGNOSIS — E11.9 TYPE 2 DIABETES MELLITUS WITHOUT COMPLICATION, WITHOUT LONG-TERM CURRENT USE OF INSULIN (HCC): ICD-10-CM

## 2018-12-05 PROCEDURE — 99214 OFFICE O/P EST MOD 30 MIN: CPT | Performed by: FAMILY MEDICINE

## 2018-12-05 RX ORDER — HYDROCHLOROTHIAZIDE 25 MG/1
25 TABLET ORAL DAILY
Qty: 30 TABLET | Refills: 5 | Status: SHIPPED | OUTPATIENT
Start: 2018-12-05 | End: 2019-06-05 | Stop reason: SDUPTHER

## 2018-12-05 RX ORDER — SERTRALINE HYDROCHLORIDE 100 MG/1
100 TABLET, FILM COATED ORAL DAILY
Qty: 30 TABLET | Refills: 5 | Status: SHIPPED | OUTPATIENT
Start: 2018-12-05 | End: 2019-06-05 | Stop reason: SDUPTHER

## 2018-12-05 RX ORDER — CARVEDILOL 12.5 MG/1
12.5 TABLET ORAL 2 TIMES DAILY WITH MEALS
Qty: 60 TABLET | Refills: 5 | Status: SHIPPED | OUTPATIENT
Start: 2018-12-05 | End: 2019-06-05 | Stop reason: SDUPTHER

## 2018-12-05 RX ORDER — ATORVASTATIN CALCIUM 20 MG/1
20 TABLET, FILM COATED ORAL NIGHTLY
Qty: 30 TABLET | Refills: 5 | Status: SHIPPED | OUTPATIENT
Start: 2018-12-05 | End: 2019-06-05 | Stop reason: SDUPTHER

## 2018-12-05 RX ORDER — METFORMIN HYDROCHLORIDE 500 MG/1
1000 TABLET, EXTENDED RELEASE ORAL
Qty: 60 TABLET | Refills: 5 | Status: SHIPPED | OUTPATIENT
Start: 2018-12-05 | End: 2019-06-05 | Stop reason: SDUPTHER

## 2018-12-05 RX ORDER — OMEPRAZOLE 40 MG/1
40 CAPSULE, DELAYED RELEASE ORAL NIGHTLY
Qty: 30 CAPSULE | Refills: 5 | Status: SHIPPED | OUTPATIENT
Start: 2018-12-05 | End: 2019-06-05 | Stop reason: SDUPTHER

## 2018-12-05 RX ORDER — AMLODIPINE BESYLATE 5 MG/1
5 TABLET ORAL DAILY
Qty: 30 TABLET | Refills: 5 | Status: SHIPPED | OUTPATIENT
Start: 2018-12-05 | End: 2019-06-05 | Stop reason: SDUPTHER

## 2018-12-05 NOTE — PROGRESS NOTES
"Chief Complaint   Patient presents with   • Hypertension   • Diabetes       Subjective     Oseas Brasher presents to the office today to refill her medications. No medication side effects are reported.  Labs from late October been reviewed and are seen below.  Blood pressure is controlled.  Lipids are controlled.  She does have continued elevation of triglycerides.  She will look into Vacepa.  GERD symptoms are stable.  Type 2 diabetes is to goal.  She continues to treat her anxiety successfully with current medications.    Recent right breast biopsy was negative for cancer.    I have reviewed and updated her medications, medical history and problem list during today's office visit.     Patient Care Team:  Hunter Sánchez MD as PCP - General (Family Medicine)    Social History     Tobacco Use   • Smoking status: Never Smoker   • Smokeless tobacco: Never Used   Substance Use Topics   • Alcohol use: Yes     Types: 4 Glasses of wine, 8 Cans of beer, 2 Shots of liquor per week     Comment: varies       Review of Systems   Constitutional: Negative for fatigue.   Cardiovascular: Negative for chest pain.       Objective     /80   Pulse 74   Temp 97.4 °F (36.3 °C) (Oral)   Resp 16   Ht 157.5 cm (62.01\")   Wt 85.3 kg (188 lb)   BMI 34.37 kg/m²     Body mass index is 34.37 kg/m².    Physical Exam   Constitutional: She is oriented to person, place, and time. She appears well-developed. No distress.   Eyes: Conjunctivae and lids are normal.   Neck: Carotid bruit is not present.   Cardiovascular: Normal rate, regular rhythm and normal heart sounds.   Pulmonary/Chest: Effort normal and breath sounds normal.   Neurological: She is alert and oriented to person, place, and time.   Skin: Skin is warm and dry.   Psychiatric: She has a normal mood and affect. Her behavior is normal.   Vitals reviewed.      Data Reviewed:    Final Diagnosis    RIGHT BREAST UPPER-OUTER QUADRANT, STEREOTACTIC GUIDED BIOPSY:  Fibrocystic changes " with fibrosis, micro cysts, and focal adenosis.  Calcifications present associated with benign fibrocystic changes.  Negative for atypia.             Lab Results   Component Value Date     (H) 09/06/2016    BUN 8 (L) 10/24/2018    CREATININE 0.53 (L) 10/24/2018    EGFRIFNONA 120 10/24/2018    EGFRIFAFRI 124 09/06/2016     10/24/2018    K 4.0 10/24/2018    CL 97 (L) 10/24/2018    CO2 28.0 10/24/2018    CALCIUM 9.5 10/24/2018    ALBUMIN 4.62 10/24/2018    LABGLOBREF 2.9 09/06/2016    BILITOT 0.7 10/24/2018    ALKPHOS 105 (H) 10/24/2018    AST 28 10/24/2018    ALT 33 10/24/2018    CHLPL 109 09/06/2016    TRIG 246 (H) 10/24/2018    HDL 40 10/24/2018    VLDL 39 11/01/2017    LDL 46 10/24/2018    LDL 32 11/01/2017    LDLHDL 0.76 11/01/2017    HGBA1C 6.90 (H) 10/24/2018    MICROALBUR <3.0 10/24/2018    WBC 6.51 10/24/2018    RBC 4.47 10/24/2018    HCT 37.9 10/24/2018    MCV 84.8 10/24/2018    MCH 27.5 10/24/2018    MCHC 32.5 10/24/2018    RDW 14.3 10/24/2018    TSH 2.760 10/24/2018          Assessment/Plan     Problem List Items Addressed This Visit     Anxiety     The current medical regimen is effective;  continue present plan and medications.           Relevant Medications    sertraline (ZOLOFT) 100 MG tablet    Gastroesophageal reflux disease without esophagitis     The current medical regimen is effective;  continue present plan and medications.           Relevant Medications    omeprazole (priLOSEC) 40 MG capsule    Other hyperlipidemia     Lipid abnormalities are improving with treatment.  Pharmacotherapy as ordered.  Lipids will be reassessed in 6 months.         Relevant Medications    atorvastatin (LIPITOR) 20 MG tablet    Other Relevant Orders    Lipid Panel With / Chol / HDL Ratio    Essential hypertension - Primary     Hypertension is unchanged.  Continue current treatment regimen.  Blood pressure will be reassessed at the next regular appointment.         Relevant Medications     hydrochlorothiazide (HYDRODIURIL) 25 MG tablet    carvedilol (COREG) 12.5 MG tablet    amLODIPine (NORVASC) 5 MG tablet    Other Relevant Orders    Comprehensive Metabolic Panel    CBC & Differential    Type 2 diabetes mellitus without complication, without long-term current use of insulin (CMS/[facility])    Relevant Medications    metFORMIN ER (GLUCOPHAGE-XR) 500 MG 24 hr tablet    Other Relevant Orders    Hemoglobin A1c    MicroAlbumin, Urine, Random - Urine, Clean Catch          Orders Placed This Encounter   Procedures   • Comprehensive Metabolic Panel     Standing Status:   Future     Standing Expiration Date:   9/1/2019   • Lipid Panel With / Chol / HDL Ratio     Standing Status:   Future     Standing Expiration Date:   9/1/2019   • Hemoglobin A1c     Standing Status:   Future     Standing Expiration Date:   9/1/2019   • MicroAlbumin, Urine, Random - Urine, Clean Catch     Standing Status:   Future     Standing Expiration Date:   9/1/2019   • CBC & Differential     Standing Status:   Future     Standing Expiration Date:   9/1/2019     Order Specific Question:   Manual Differential     Answer:   No         Current Outpatient Medications:   •  ALPRAZolam (XANAX) 0.5 MG tablet, Take 0.5 mg by mouth., Disp: , Rfl:   •  amLODIPine (NORVASC) 5 MG tablet, Take 1 tablet by mouth Daily for 180 days., Disp: 30 tablet, Rfl: 5  •  aspirin 81 MG tablet, Take 81 mg by mouth daily., Disp: , Rfl:   •  atorvastatin (LIPITOR) 20 MG tablet, Take 1 tablet by mouth Every Night for 180 days., Disp: 30 tablet, Rfl: 5  •  carvedilol (COREG) 12.5 MG tablet, Take 1 tablet by mouth 2 (Two) Times a Day With Meals for 180 days., Disp: 60 tablet, Rfl: 5  •  hydrochlorothiazide (HYDRODIURIL) 25 MG tablet, Take 1 tablet by mouth Daily for 180 days., Disp: 30 tablet, Rfl: 5  •  metFORMIN ER (GLUCOPHAGE-XR) 500 MG 24 hr tablet, Take 2 tablets by mouth Daily With Dinner for 180 days., Disp: 60 tablet, Rfl: 5  •  Omega-3 Fatty Acids (FISH OIL PO),  Take  by mouth Daily., Disp: , Rfl:   •  omeprazole (priLOSEC) 40 MG capsule, Take 1 capsule by mouth Every Night for 180 days., Disp: 30 capsule, Rfl: 5  •  sertraline (ZOLOFT) 100 MG tablet, Take 1 tablet by mouth Daily for 180 days., Disp: 30 tablet, Rfl: 5    Return in about 6 months (around 6/5/2019) for Recheck.

## 2019-05-04 ENCOUNTER — RESULTS ENCOUNTER (OUTPATIENT)
Dept: FAMILY MEDICINE CLINIC | Facility: CLINIC | Age: 54
End: 2019-05-04

## 2019-05-04 DIAGNOSIS — I10 ESSENTIAL HYPERTENSION: ICD-10-CM

## 2019-05-04 DIAGNOSIS — E78.49 OTHER HYPERLIPIDEMIA: ICD-10-CM

## 2019-05-04 DIAGNOSIS — E11.9 TYPE 2 DIABETES MELLITUS WITHOUT COMPLICATION, WITHOUT LONG-TERM CURRENT USE OF INSULIN (HCC): ICD-10-CM

## 2019-05-08 ENCOUNTER — TRANSCRIBE ORDERS (OUTPATIENT)
Dept: MAMMOGRAPHY | Facility: HOSPITAL | Age: 54
End: 2019-05-08

## 2019-05-08 DIAGNOSIS — R92.8 ABNORMAL MAMMOGRAM: Primary | ICD-10-CM

## 2019-05-23 ENCOUNTER — TRANSCRIBE ORDERS (OUTPATIENT)
Dept: MAMMOGRAPHY | Facility: HOSPITAL | Age: 54
End: 2019-05-23

## 2019-05-23 ENCOUNTER — HOSPITAL ENCOUNTER (OUTPATIENT)
Dept: MAMMOGRAPHY | Facility: HOSPITAL | Age: 54
Discharge: HOME | End: 2019-05-23
Admitting: FAMILY MEDICINE

## 2019-05-23 DIAGNOSIS — R92.8 ABNORMAL MAMMOGRAM: Primary | ICD-10-CM

## 2019-05-23 DIAGNOSIS — R92.8 ABNORMAL MAMMOGRAM: ICD-10-CM

## 2019-05-23 PROCEDURE — 77065 DX MAMMO INCL CAD UNI: CPT | Performed by: RADIOLOGY

## 2019-05-23 PROCEDURE — 77065 DX MAMMO INCL CAD UNI: CPT

## 2019-06-04 ENCOUNTER — APPOINTMENT (OUTPATIENT)
Dept: LAB | Facility: HOSPITAL | Age: 54
End: 2019-06-04

## 2019-06-04 PROCEDURE — 80053 COMPREHEN METABOLIC PANEL: CPT | Performed by: FAMILY MEDICINE

## 2019-06-04 PROCEDURE — 80061 LIPID PANEL: CPT | Performed by: FAMILY MEDICINE

## 2019-06-04 PROCEDURE — 83036 HEMOGLOBIN GLYCOSYLATED A1C: CPT | Performed by: FAMILY MEDICINE

## 2019-06-04 PROCEDURE — 85025 COMPLETE CBC W/AUTO DIFF WBC: CPT | Performed by: FAMILY MEDICINE

## 2019-06-04 PROCEDURE — 36415 COLL VENOUS BLD VENIPUNCTURE: CPT | Performed by: FAMILY MEDICINE

## 2019-06-04 PROCEDURE — 82043 UR ALBUMIN QUANTITATIVE: CPT | Performed by: FAMILY MEDICINE

## 2019-06-05 ENCOUNTER — OFFICE VISIT (OUTPATIENT)
Dept: FAMILY MEDICINE CLINIC | Facility: CLINIC | Age: 54
End: 2019-06-05

## 2019-06-05 VITALS
OXYGEN SATURATION: 97 % | DIASTOLIC BLOOD PRESSURE: 76 MMHG | RESPIRATION RATE: 16 BRPM | WEIGHT: 172 LBS | BODY MASS INDEX: 31.65 KG/M2 | HEART RATE: 62 BPM | TEMPERATURE: 97.4 F | SYSTOLIC BLOOD PRESSURE: 116 MMHG | HEIGHT: 62 IN

## 2019-06-05 DIAGNOSIS — K21.9 GASTROESOPHAGEAL REFLUX DISEASE WITHOUT ESOPHAGITIS: ICD-10-CM

## 2019-06-05 DIAGNOSIS — E78.2 MIXED HYPERLIPIDEMIA: ICD-10-CM

## 2019-06-05 DIAGNOSIS — E66.09 CLASS 1 OBESITY DUE TO EXCESS CALORIES WITH SERIOUS COMORBIDITY AND BODY MASS INDEX (BMI) OF 31.0 TO 31.9 IN ADULT: ICD-10-CM

## 2019-06-05 DIAGNOSIS — Z23 IMMUNIZATION DUE: ICD-10-CM

## 2019-06-05 DIAGNOSIS — E11.9 TYPE 2 DIABETES MELLITUS WITHOUT COMPLICATION, WITHOUT LONG-TERM CURRENT USE OF INSULIN (HCC): ICD-10-CM

## 2019-06-05 DIAGNOSIS — F41.9 ANXIETY: ICD-10-CM

## 2019-06-05 DIAGNOSIS — I10 ESSENTIAL HYPERTENSION: Primary | ICD-10-CM

## 2019-06-05 LAB
ALBUMIN SERPL-MCNC: 4.4 G/DL (ref 3.5–5.5)
ALBUMIN/GLOB SERPL: 1.8 {RATIO} (ref 1.2–2.2)
ALP SERPL-CCNC: 98 IU/L (ref 39–117)
ALT SERPL-CCNC: 19 IU/L (ref 0–32)
AST SERPL-CCNC: 23 IU/L (ref 0–40)
BASOPHILS # BLD AUTO: 0 X10E3/UL (ref 0–0.2)
BASOPHILS NFR BLD AUTO: 1 %
BILIRUB SERPL-MCNC: 0.5 MG/DL (ref 0–1.2)
BUN SERPL-MCNC: 8 MG/DL (ref 6–24)
BUN/CREAT SERPL: 15 (ref 9–23)
CALCIUM SERPL-MCNC: 9.9 MG/DL (ref 8.7–10.2)
CHLORIDE SERPL-SCNC: 98 MMOL/L (ref 96–106)
CHOLEST SERPL-MCNC: 112 MG/DL (ref 100–199)
CHOLEST/HDLC SERPL: 3.5 RATIO (ref 0–4.4)
CO2 SERPL-SCNC: 21 MMOL/L (ref 20–29)
CREAT SERPL-MCNC: 0.52 MG/DL (ref 0.57–1)
EOSINOPHIL # BLD AUTO: 0.1 X10E3/UL (ref 0–0.4)
EOSINOPHIL # BLD AUTO: 2 %
ERYTHROCYTE [DISTWIDTH] IN BLOOD BY AUTOMATED COUNT: 14.6 % (ref 12.3–15.4)
GLOBULIN SER CALC-MCNC: 2.5 G/DL (ref 1.5–4.5)
GLUCOSE SERPL-MCNC: 106 MG/DL (ref 65–99)
HCT VFR BLD AUTO: 37.2 % (ref 34–46.6)
HDLC SERPL-MCNC: 32 MG/DL
HGB BLD-MCNC: 11.8 G/DL (ref 11.1–15.9)
IMM GRANULOCYTES # BLD: 0 X10E3/UL (ref 0–0.1)
IMM GRANULOCYTES NFR BLD: 0 %
LDLC SERPL CALC-MCNC: 35 MG/DL (ref 0–99)
LYMPHOCYTES # BLD AUTO: 1.8 X10E3/UL (ref 0.7–3.1)
LYMPHOCYTES NFR BLD AUTO: 29 %
MCH RBC QN AUTO: 27.6 PG (ref 26.6–33)
MCHC RBC AUTO-ENTMCNC: 31.7 G/DL (ref 31.5–35.7)
MCV RBC AUTO: 87 FL (ref 79–97)
MONOCYTES # BLD AUTO: 0.3 X10E3/UL (ref 0.1–0.9)
MONOCYTES NFR BLD AUTO: 6 %
NEUTROPHILS # BLD AUTO: 3.9 X10E3/UL (ref 1.4–7)
NEUTROPHILS NFR BLD AUTO: 62 %
PLATELET # BLD AUTO: 379 X10E3/UL (ref 150–450)
POTASSIUM SERPL-SCNC: 4.4 MMOL/L (ref 3.5–5.2)
PROT SERPL-MCNC: 6.9 G/DL (ref 6–8.5)
RBC # BLD AUTO: 4.28 X10E6/UL (ref 3.77–5.28)
SODIUM SERPL-SCNC: 139 MMOL/L (ref 134–144)
TRIGL SERPL-MCNC: 227 MG/DL (ref 0–149)
VLDLC SERPL-MCNC: 45 MG/DL (ref 5–40)
WBC # BLD AUTO: 6.2 X10E3/UL (ref 3.4–10.8)

## 2019-06-05 PROCEDURE — 99214 OFFICE O/P EST MOD 30 MIN: CPT | Performed by: FAMILY MEDICINE

## 2019-06-05 PROCEDURE — 90471 IMMUNIZATION ADMIN: CPT | Performed by: FAMILY MEDICINE

## 2019-06-05 PROCEDURE — 90750 HZV VACC RECOMBINANT IM: CPT | Performed by: FAMILY MEDICINE

## 2019-06-05 RX ORDER — ATORVASTATIN CALCIUM 20 MG/1
20 TABLET, FILM COATED ORAL NIGHTLY
Qty: 30 TABLET | Refills: 5 | Status: SHIPPED | OUTPATIENT
Start: 2019-06-05 | End: 2019-12-23

## 2019-06-05 RX ORDER — METFORMIN HYDROCHLORIDE 500 MG/1
1000 TABLET, EXTENDED RELEASE ORAL
Qty: 60 TABLET | Refills: 5 | Status: SHIPPED | OUTPATIENT
Start: 2019-06-05 | End: 2019-12-23

## 2019-06-05 RX ORDER — SERTRALINE HYDROCHLORIDE 100 MG/1
100 TABLET, FILM COATED ORAL DAILY
Qty: 30 TABLET | Refills: 5 | Status: SHIPPED | OUTPATIENT
Start: 2019-06-05 | End: 2019-12-23

## 2019-06-05 RX ORDER — AMLODIPINE BESYLATE 5 MG/1
5 TABLET ORAL DAILY
Qty: 30 TABLET | Refills: 5 | Status: SHIPPED | OUTPATIENT
Start: 2019-06-05 | End: 2019-12-23

## 2019-06-05 RX ORDER — OMEPRAZOLE 40 MG/1
40 CAPSULE, DELAYED RELEASE ORAL NIGHTLY
Qty: 30 CAPSULE | Refills: 5 | Status: SHIPPED | OUTPATIENT
Start: 2019-06-05 | End: 2019-12-23

## 2019-06-05 RX ORDER — CARVEDILOL 12.5 MG/1
12.5 TABLET ORAL 2 TIMES DAILY WITH MEALS
Qty: 60 TABLET | Refills: 5 | Status: SHIPPED | OUTPATIENT
Start: 2019-06-05 | End: 2019-12-23

## 2019-06-05 RX ORDER — HYDROCHLOROTHIAZIDE 25 MG/1
25 TABLET ORAL DAILY
Qty: 30 TABLET | Refills: 5 | Status: SHIPPED | OUTPATIENT
Start: 2019-06-05 | End: 2019-12-23

## 2019-06-05 NOTE — PROGRESS NOTES
"Chief Complaint   Patient presents with   • Hypertension     6 month follow up        Subjective     Oseas Brasher presents to the office today to refill her medications and review recent labs. No medication side effects are reported.  Blood pressure is controlled.  Lipids are controlled with current medication.  Type 2 diabetes is controlled with current therapy.  Interval improvement in weight.  She is needing to take proton pump inhibitor less frequently due to weight loss.  Anxiety is stable.    I have reviewed and updated her medications, medical history and problem list during today's office visit.      Patient Care Team:  Hunter Sánchez MD as PCP - General (Family Medicine)  Bayron Samson MD as Consulting Physician (Neurology)    Social History     Tobacco Use   • Smoking status: Never Smoker   • Smokeless tobacco: Never Used   Substance Use Topics   • Alcohol use: Yes     Types: 4 Glasses of wine, 8 Cans of beer, 2 Shots of liquor per week     Comment: varies       Review of Systems   Constitutional: Negative for fatigue.   Cardiovascular: Negative for chest pain.       Objective     /76   Pulse 62   Temp 97.4 °F (36.3 °C) (Oral)   Resp 16   Ht 157.5 cm (62.01\")   Wt 78 kg (172 lb)   LMP 06/20/2017 (Exact Date)   SpO2 97%   BMI 31.45 kg/m²     Body mass index is 31.45 kg/m².    Physical Exam   Constitutional: She is oriented to person, place, and time. She appears well-developed. No distress.   Eyes: Conjunctivae and lids are normal.   Neck: Carotid bruit is not present.   Cardiovascular: Normal rate and regular rhythm.   Murmur heard.  Pulmonary/Chest: Effort normal and breath sounds normal.   Neurological: She is alert and oriented to person, place, and time.   Skin: Skin is warm and dry.   Psychiatric: She has a normal mood and affect. Her behavior is normal.   Vitals reviewed.      Data Reviewed:            Lab Results   Component Value Date     (H) 06/04/2019    BUN 8 " 06/04/2019    CREATININE 0.52 (L) 06/04/2019    EGFRIFNONA 108 06/04/2019    EGFRIFAFRI 124 06/04/2019     06/04/2019    K 4.4 06/04/2019    CL 98 06/04/2019    CALCIUM 9.9 06/04/2019    ALBUMIN 4.4 06/04/2019    BILITOT 0.5 06/04/2019    ALKPHOS 98 06/04/2019    AST 23 06/04/2019    ALT 19 06/04/2019    CHLPL 112 06/04/2019    TRIG 227 (H) 06/04/2019    HDL 32 (L) 06/04/2019    VLDL 45 (H) 06/04/2019    LDL 35 06/04/2019    WBC 6.2 06/04/2019    RBC 4.28 06/04/2019    HCT 37.2 06/04/2019    MCV 87 06/04/2019    MCH 27.6 06/04/2019        Assessment/Plan     Diagnoses and all orders for this visit:    1. Essential hypertension (Primary)  Assessment & Plan:  Hypertension is improving with lifestyle modifications.  Medication changes per orders.  Blood pressure will be reassessed at the next regular appointment.    Orders:  -     hydrochlorothiazide (HYDRODIURIL) 25 MG tablet; Take 1 tablet by mouth Daily for 180 days.  Dispense: 30 tablet; Refill: 5  -     carvedilol (COREG) 12.5 MG tablet; Take 1 tablet by mouth 2 (Two) Times a Day With Meals for 180 days.  Dispense: 60 tablet; Refill: 5  -     amLODIPine (NORVASC) 5 MG tablet; Take 1 tablet by mouth Daily for 180 days.  Dispense: 30 tablet; Refill: 5  -     Comprehensive Metabolic Panel; Future  -     CBC & Differential; Future    2. Anxiety  Assessment & Plan:  The current medical regimen is effective;  continue present plan and medications.      Orders:  -     sertraline (ZOLOFT) 100 MG tablet; Take 1 tablet by mouth Daily for 180 days.  Dispense: 30 tablet; Refill: 5    3. Gastroesophageal reflux disease without esophagitis  Assessment & Plan:  Improving with weight loss    Orders:  -     omeprazole (priLOSEC) 40 MG capsule; Take 1 capsule by mouth Every Night for 180 days.  Dispense: 30 capsule; Refill: 5    4. Type 2 diabetes mellitus without complication, without long-term current use of insulin (CMS/[facility])  Assessment & Plan:  Diabetes is unchanged.    Continue current treatment regimen.  Diabetes will be reassessed in 6 months.    Orders:  -     metFORMIN ER (GLUCOPHAGE-XR) 500 MG 24 hr tablet; Take 2 tablets by mouth Daily With Dinner for 180 days.  Dispense: 60 tablet; Refill: 5  -     Hemoglobin A1c; Future  -     MicroAlbumin, Urine, Random - Urine, Clean Catch; Future    5. Mixed hyperlipidemia  Assessment & Plan:  Lipid abnormalities are unchanged.  Pharmacotherapy as ordered.  Lipids will be reassessed in 6 months.    Orders:  -     atorvastatin (LIPITOR) 20 MG tablet; Take 1 tablet by mouth Every Night for 180 days.  Dispense: 30 tablet; Refill: 5  -     Lipid Panel With / Chol / HDL Ratio; Future    6. Class 1 obesity due to excess calories with serious comorbidity and body mass index (BMI) of 31.0 to 31.9 in adult  Assessment & Plan:  Obesity is improving with lifestyle modifications.  continue the same plan.        7. Immunization due  -     Shingrix Vaccine      Return in about 6 months (around 12/5/2019) for Recheck.

## 2019-06-05 NOTE — ASSESSMENT & PLAN NOTE
Hypertension is improving with lifestyle modifications.  Medication changes per orders.  Blood pressure will be reassessed at the next regular appointment.

## 2019-06-05 NOTE — ASSESSMENT & PLAN NOTE
Lens Material: The current medical regimen is effective;  continue present plan and medications.

## 2019-06-06 LAB — MICROALBUMIN UR-MCNC: <3 UG/ML

## 2019-11-02 ENCOUNTER — RESULTS ENCOUNTER (OUTPATIENT)
Dept: FAMILY MEDICINE CLINIC | Facility: CLINIC | Age: 54
End: 2019-11-02

## 2019-11-02 DIAGNOSIS — I10 ESSENTIAL HYPERTENSION: ICD-10-CM

## 2019-11-02 DIAGNOSIS — E78.2 MIXED HYPERLIPIDEMIA: ICD-10-CM

## 2019-11-02 DIAGNOSIS — E11.9 TYPE 2 DIABETES MELLITUS WITHOUT COMPLICATION, WITHOUT LONG-TERM CURRENT USE OF INSULIN (HCC): ICD-10-CM

## 2019-11-26 ENCOUNTER — HOSPITAL ENCOUNTER (OUTPATIENT)
Dept: MAMMOGRAPHY | Facility: HOSPITAL | Age: 54
Discharge: HOME | End: 2019-11-26
Admitting: FAMILY MEDICINE

## 2019-11-26 DIAGNOSIS — R92.8 ABNORMAL MAMMOGRAM: ICD-10-CM

## 2019-11-26 PROCEDURE — G0279 TOMOSYNTHESIS, MAMMO: HCPCS

## 2019-11-26 PROCEDURE — 77066 DX MAMMO INCL CAD BI: CPT

## 2019-11-26 PROCEDURE — 77066 DX MAMMO INCL CAD BI: CPT | Performed by: RADIOLOGY

## 2019-11-26 PROCEDURE — 77062 BREAST TOMOSYNTHESIS BI: CPT | Performed by: RADIOLOGY

## 2019-12-22 DIAGNOSIS — K21.9 GASTROESOPHAGEAL REFLUX DISEASE WITHOUT ESOPHAGITIS: ICD-10-CM

## 2019-12-22 DIAGNOSIS — E78.2 MIXED HYPERLIPIDEMIA: ICD-10-CM

## 2019-12-22 DIAGNOSIS — E11.9 TYPE 2 DIABETES MELLITUS WITHOUT COMPLICATION, WITHOUT LONG-TERM CURRENT USE OF INSULIN (HCC): ICD-10-CM

## 2019-12-22 DIAGNOSIS — I10 ESSENTIAL HYPERTENSION: ICD-10-CM

## 2019-12-22 DIAGNOSIS — F41.9 ANXIETY: ICD-10-CM

## 2019-12-23 RX ORDER — ATORVASTATIN CALCIUM 20 MG/1
TABLET, FILM COATED ORAL
Qty: 30 TABLET | Refills: 0 | Status: SHIPPED | OUTPATIENT
Start: 2019-12-23 | End: 2019-12-31

## 2019-12-23 RX ORDER — OMEPRAZOLE 40 MG/1
CAPSULE, DELAYED RELEASE ORAL
Qty: 30 CAPSULE | Refills: 0 | Status: SHIPPED | OUTPATIENT
Start: 2019-12-23 | End: 2019-12-31 | Stop reason: SDUPTHER

## 2019-12-23 RX ORDER — METFORMIN HYDROCHLORIDE 500 MG/1
TABLET, EXTENDED RELEASE ORAL
Qty: 60 TABLET | Refills: 0 | Status: SHIPPED | OUTPATIENT
Start: 2019-12-23 | End: 2019-12-31 | Stop reason: ALTCHOICE

## 2019-12-23 RX ORDER — AMLODIPINE BESYLATE 5 MG/1
TABLET ORAL
Qty: 30 TABLET | Refills: 0 | Status: SHIPPED | OUTPATIENT
Start: 2019-12-23 | End: 2019-12-31 | Stop reason: SDUPTHER

## 2019-12-23 RX ORDER — CARVEDILOL 12.5 MG/1
TABLET ORAL
Qty: 60 TABLET | Refills: 0 | Status: SHIPPED | OUTPATIENT
Start: 2019-12-23 | End: 2019-12-31 | Stop reason: SDUPTHER

## 2019-12-23 RX ORDER — HYDROCHLOROTHIAZIDE 25 MG/1
TABLET ORAL
Qty: 30 TABLET | Refills: 0 | Status: SHIPPED | OUTPATIENT
Start: 2019-12-23 | End: 2019-12-31 | Stop reason: SDUPTHER

## 2019-12-23 RX ORDER — SERTRALINE HYDROCHLORIDE 100 MG/1
TABLET, FILM COATED ORAL
Qty: 30 TABLET | Refills: 0 | Status: SHIPPED | OUTPATIENT
Start: 2019-12-23 | End: 2019-12-31 | Stop reason: SDUPTHER

## 2019-12-30 ENCOUNTER — APPOINTMENT (OUTPATIENT)
Dept: LAB | Facility: HOSPITAL | Age: 54
End: 2019-12-30

## 2019-12-30 LAB
ALBUMIN SERPL-MCNC: 4.4 G/DL (ref 3.5–5.2)
ALBUMIN UR-MCNC: <1.2 MG/DL
ALBUMIN/GLOB SERPL: 1.5 G/DL
ALP SERPL-CCNC: 75 U/L (ref 39–117)
ALT SERPL W P-5'-P-CCNC: 10 U/L (ref 1–33)
ANION GAP SERPL CALCULATED.3IONS-SCNC: 15 MMOL/L (ref 5–15)
AST SERPL-CCNC: 16 U/L (ref 1–32)
BASOPHILS # BLD AUTO: 0.04 10*3/MM3 (ref 0–0.2)
BASOPHILS NFR BLD AUTO: 0.6 % (ref 0–1.5)
BILIRUB SERPL-MCNC: 0.6 MG/DL (ref 0.2–1.2)
BUN BLD-MCNC: 11 MG/DL (ref 6–20)
BUN/CREAT SERPL: 22 (ref 7–25)
CALCIUM SPEC-SCNC: 9.6 MG/DL (ref 8.6–10.5)
CHLORIDE SERPL-SCNC: 95 MMOL/L (ref 98–107)
CHOLEST SERPL-MCNC: 127 MG/DL (ref 0–200)
CO2 SERPL-SCNC: 27 MMOL/L (ref 22–29)
CREAT BLD-MCNC: 0.5 MG/DL (ref 0.57–1)
DEPRECATED RDW RBC AUTO: 42.3 FL (ref 37–54)
EOSINOPHIL # BLD AUTO: 0.15 10*3/MM3 (ref 0–0.4)
EOSINOPHIL NFR BLD AUTO: 2.4 % (ref 0.3–6.2)
ERYTHROCYTE [DISTWIDTH] IN BLOOD BY AUTOMATED COUNT: 13.6 % (ref 12.3–15.4)
GFR SERPL CREATININE-BSD FRML MDRD: 128 ML/MIN/1.73
GLOBULIN UR ELPH-MCNC: 2.9 GM/DL
GLUCOSE BLD-MCNC: 91 MG/DL (ref 65–99)
HBA1C MFR BLD: 6.01 % (ref 4.8–5.6)
HCT VFR BLD AUTO: 36.8 % (ref 34–46.6)
HDLC SERPL QL: 2.15
HDLC SERPL-MCNC: 59 MG/DL (ref 40–60)
HGB BLD-MCNC: 12.9 G/DL (ref 12–15.9)
IMM GRANULOCYTES # BLD AUTO: 0.02 10*3/MM3 (ref 0–0.05)
IMM GRANULOCYTES NFR BLD AUTO: 0.3 % (ref 0–0.5)
LDLC SERPL CALC-MCNC: 25 MG/DL (ref 0–100)
LYMPHOCYTES # BLD AUTO: 1.85 10*3/MM3 (ref 0.7–3.1)
LYMPHOCYTES NFR BLD AUTO: 29.8 % (ref 19.6–45.3)
MCH RBC QN AUTO: 30.6 PG (ref 26.6–33)
MCHC RBC AUTO-ENTMCNC: 35.1 G/DL (ref 31.5–35.7)
MCV RBC AUTO: 87.2 FL (ref 79–97)
MONOCYTES # BLD AUTO: 0.36 10*3/MM3 (ref 0.1–0.9)
MONOCYTES NFR BLD AUTO: 5.8 % (ref 5–12)
NEUTROPHILS # BLD AUTO: 3.79 10*3/MM3 (ref 1.7–7)
NEUTROPHILS NFR BLD AUTO: 61.1 % (ref 42.7–76)
NRBC BLD AUTO-RTO: 0 /100 WBC (ref 0–0.2)
PLATELET # BLD AUTO: 306 10*3/MM3 (ref 140–450)
PMV BLD AUTO: 10.5 FL (ref 6–12)
POTASSIUM BLD-SCNC: 3.9 MMOL/L (ref 3.5–5.2)
PROT SERPL-MCNC: 7.3 G/DL (ref 6–8.5)
RBC # BLD AUTO: 4.22 10*6/MM3 (ref 3.77–5.28)
SODIUM BLD-SCNC: 137 MMOL/L (ref 136–145)
TRIGL SERPL-MCNC: 213 MG/DL (ref 0–150)
VLDLC SERPL-MCNC: 42.6 MG/DL (ref 5–40)
WBC NRBC COR # BLD: 6.21 10*3/MM3 (ref 3.4–10.8)

## 2019-12-30 PROCEDURE — 82043 UR ALBUMIN QUANTITATIVE: CPT | Performed by: FAMILY MEDICINE

## 2019-12-30 PROCEDURE — 80053 COMPREHEN METABOLIC PANEL: CPT | Performed by: FAMILY MEDICINE

## 2019-12-30 PROCEDURE — 85025 COMPLETE CBC W/AUTO DIFF WBC: CPT | Performed by: FAMILY MEDICINE

## 2019-12-30 PROCEDURE — 80061 LIPID PANEL: CPT | Performed by: FAMILY MEDICINE

## 2019-12-30 PROCEDURE — 36415 COLL VENOUS BLD VENIPUNCTURE: CPT | Performed by: FAMILY MEDICINE

## 2019-12-30 PROCEDURE — 83036 HEMOGLOBIN GLYCOSYLATED A1C: CPT | Performed by: FAMILY MEDICINE

## 2019-12-31 ENCOUNTER — OFFICE VISIT (OUTPATIENT)
Dept: FAMILY MEDICINE CLINIC | Facility: CLINIC | Age: 54
End: 2019-12-31

## 2019-12-31 VITALS
SYSTOLIC BLOOD PRESSURE: 123 MMHG | WEIGHT: 171 LBS | BODY MASS INDEX: 31.47 KG/M2 | RESPIRATION RATE: 16 BRPM | HEART RATE: 62 BPM | HEIGHT: 62 IN | DIASTOLIC BLOOD PRESSURE: 82 MMHG | OXYGEN SATURATION: 98 %

## 2019-12-31 DIAGNOSIS — E11.9 TYPE 2 DIABETES MELLITUS WITHOUT COMPLICATION, WITHOUT LONG-TERM CURRENT USE OF INSULIN (HCC): ICD-10-CM

## 2019-12-31 DIAGNOSIS — K21.9 GASTROESOPHAGEAL REFLUX DISEASE WITHOUT ESOPHAGITIS: ICD-10-CM

## 2019-12-31 DIAGNOSIS — E78.2 MIXED HYPERLIPIDEMIA: ICD-10-CM

## 2019-12-31 DIAGNOSIS — M79.18 RIGHT BUTTOCK PAIN: ICD-10-CM

## 2019-12-31 DIAGNOSIS — I10 ESSENTIAL HYPERTENSION: Primary | ICD-10-CM

## 2019-12-31 DIAGNOSIS — F41.9 ANXIETY: ICD-10-CM

## 2019-12-31 PROCEDURE — 99214 OFFICE O/P EST MOD 30 MIN: CPT | Performed by: FAMILY MEDICINE

## 2019-12-31 RX ORDER — CARVEDILOL 12.5 MG/1
12.5 TABLET ORAL 2 TIMES DAILY WITH MEALS
Qty: 60 TABLET | Refills: 5 | Status: SHIPPED | OUTPATIENT
Start: 2019-12-31 | End: 2020-07-29 | Stop reason: SDUPTHER

## 2019-12-31 RX ORDER — PIOGLITAZONEHYDROCHLORIDE 15 MG/1
15 TABLET ORAL DAILY
Qty: 30 TABLET | Refills: 5 | Status: SHIPPED | OUTPATIENT
Start: 2019-12-31 | End: 2020-06-28

## 2019-12-31 RX ORDER — OMEPRAZOLE 40 MG/1
40 CAPSULE, DELAYED RELEASE ORAL NIGHTLY
Qty: 30 CAPSULE | Refills: 5 | Status: SHIPPED | OUTPATIENT
Start: 2019-12-31 | End: 2020-07-29 | Stop reason: SDUPTHER

## 2019-12-31 RX ORDER — AMLODIPINE BESYLATE 5 MG/1
5 TABLET ORAL DAILY
Qty: 30 TABLET | Refills: 5 | Status: SHIPPED | OUTPATIENT
Start: 2019-12-31 | End: 2020-07-29 | Stop reason: SDUPTHER

## 2019-12-31 RX ORDER — HYDROCHLOROTHIAZIDE 25 MG/1
25 TABLET ORAL DAILY
Qty: 30 TABLET | Refills: 5 | Status: SHIPPED | OUTPATIENT
Start: 2019-12-31 | End: 2020-07-29 | Stop reason: SDUPTHER

## 2019-12-31 RX ORDER — ATORVASTATIN CALCIUM 20 MG/1
20 TABLET, FILM COATED ORAL NIGHTLY
Qty: 30 TABLET | Refills: 5 | Status: SHIPPED | OUTPATIENT
Start: 2019-12-31 | End: 2020-07-29 | Stop reason: SDUPTHER

## 2019-12-31 RX ORDER — SERTRALINE HYDROCHLORIDE 100 MG/1
100 TABLET, FILM COATED ORAL DAILY
Qty: 30 TABLET | Refills: 5 | Status: SHIPPED | OUTPATIENT
Start: 2019-12-31 | End: 2020-07-29 | Stop reason: SDUPTHER

## 2019-12-31 NOTE — PROGRESS NOTES
"   Subjective       Chief Complaint   Patient presents with   • Hypertension     6 mo follow up          History of Present Illness   Oseas Brasher is a 54 y.o. female who presents to the office today to review labs and refill medicines.  Blood pressure is to goal.  Lipids are to goal.  She still has a mixed pattern with elevation of triglycerides.  The triglycerides have shown interval improvement.  GERD symptoms are stable.  She continues to have nausea.  This could be side effect of metformin.  She has difficulty swallowing pills and has been chewing the metformin.  We will switch therapy.  Type 2 diabetes is controlled.  We will try pioglitazone for continued control.  Weight is essentially stable.  Overall she feels well except for fatigue, nausea and occasional palpitations.     I have reviewed and updated her medications, medical history and problem list during today's office visit.     Social History     Tobacco Use   • Smoking status: Never Smoker   • Smokeless tobacco: Never Used   Substance Use Topics   • Alcohol use: Yes     Types: 4 Glasses of wine, 8 Cans of beer, 2 Shots of liquor per week     Comment: varies       Review of Systems   Constitutional: Negative for fatigue.   Cardiovascular: Positive for palpitations. Negative for chest pain.   Gastrointestinal: Positive for nausea.   Musculoskeletal:        Right buttock/hip pain   Psychiatric/Behavioral: Positive for stress.         Physical Examination:   Objective   /82   Pulse 62   Resp 16   Ht 157.5 cm (62.01\")   Wt 77.6 kg (171 lb)   LMP 06/20/2017 (Exact Date)   SpO2 98%   BMI 31.27 kg/m²     Body mass index is 31.27 kg/m².    Physical Exam   Constitutional: She is oriented to person, place, and time. She appears well-developed. No distress.   Eyes: Conjunctivae and lids are normal.   Neck: Carotid bruit is not present.   Cardiovascular: Normal rate, regular rhythm and normal heart sounds.   Pulmonary/Chest: Effort normal and breath " sounds normal.    Oseas had a diabetic foot exam performed today.   During the foot exam she had a monofilament test performed (normal sensation in 3 points, bilaterally).    Neurological Sensory Findings -  No right ankle/foot altered proprioception and no left ankle/foot altered proprioception  Vascular Status -  Her right foot exhibits normal foot vasculature  and no edema. Her left foot exhibits normal foot vasculature  and no edema.  Skin Integrity  -  Her right foot skin is intact.Her left foot skin is intact..  Neurological: She is alert and oriented to person, place, and time.   Skin: Skin is warm and dry.   Psychiatric: She has a normal mood and affect. Her behavior is normal.   Vitals reviewed.       Data Reviewed:              Lab Results   Component Value Date    BUN 11 12/30/2019    CREATININE 0.50 (L) 12/30/2019    EGFRIFNONA 128 12/30/2019     12/30/2019    K 3.9 12/30/2019    CL 95 (L) 12/30/2019    CALCIUM 9.6 12/30/2019    ALBUMIN 4.40 12/30/2019    BILITOT 0.6 12/30/2019    ALKPHOS 75 12/30/2019    AST 16 12/30/2019    ALT 10 12/30/2019    TRIG 213 (H) 12/30/2019    HDL 59 12/30/2019    VLDL 42.6 (H) 12/30/2019    LDL 25 12/30/2019    MICROALBUR <1.2 12/30/2019    WBC 6.21 12/30/2019    RBC 4.22 12/30/2019    HCT 36.8 12/30/2019    MCV 87.2 12/30/2019    MCH 30.6 12/30/2019          Assessment/Plan:   Assessment/Plan   Diagnoses and all orders for this visit:    1. Essential hypertension (Primary)  Assessment & Plan:  Hypertension is unchanged.  Continue current treatment regimen.  Blood pressure will be reassessed at the next regular appointment.    Orders:  -     amLODIPine (NORVASC) 5 MG tablet; Take 1 tablet by mouth Daily for 180 days.  Dispense: 30 tablet; Refill: 5  -     carvedilol (COREG) 12.5 MG tablet; Take 1 tablet by mouth 2 (Two) Times a Day With Meals for 180 days.  Dispense: 60 tablet; Refill: 5  -     hydroCHLOROthiazide (HYDRODIURIL) 25 MG tablet; Take 1 tablet by mouth  Daily for 180 days.  Dispense: 30 tablet; Refill: 5  -     Comprehensive Metabolic Panel; Future  -     CBC & Differential; Future  -     TSH; Future    2. Type 2 diabetes mellitus without complication, without long-term current use of insulin (CMS/[facility])  Assessment & Plan:  Diabetes is unchanged.   Medication changes per orders.  Changed to pioglitazone.  Discontinue metformin due to presumed side effect of nausea.  Diabetes will be reassessed in 6 months.    Orders:  -     pioglitazone (ACTOS) 15 MG tablet; Take 1 tablet by mouth Daily for 180 days.  Dispense: 30 tablet; Refill: 5  -     Hemoglobin A1c; Future  -     MicroAlbumin, Urine, Random - Urine, Clean Catch; Future    3. Gastroesophageal reflux disease without esophagitis  Assessment & Plan:  The current medical regimen is effective;  continue present plan and medications.      Orders:  -     omeprazole (priLOSEC) 40 MG capsule; Take 1 capsule by mouth Every Night for 180 days.  Dispense: 30 capsule; Refill: 5    4. Anxiety  Assessment & Plan:  The current medical regimen is effective;  continue present plan and medications.      Orders:  -     sertraline (ZOLOFT) 100 MG tablet; Take 1 tablet by mouth Daily for 180 days.  Dispense: 30 tablet; Refill: 5    5. Mixed hyperlipidemia  Assessment & Plan:  Lipid abnormalities are improving with treatment.  Pharmacotherapy as ordered.  Lipids will be reassessed in 6 months.    Orders:  -     atorvastatin (LIPITOR) 20 MG tablet; Take 1 tablet by mouth Every Night for 180 days.  Dispense: 30 tablet; Refill: 5  -     Lipid Panel With / Chol / HDL Ratio; Future  -     CK; Future    6. Right buttock pain  -     Ambulatory Referral to Orthopedic Surgery    Patient Instructions         Follow up:   Return in about 6 months (around 6/30/2020) for Recheck.

## 2019-12-31 NOTE — ASSESSMENT & PLAN NOTE
Diabetes is unchanged.   Medication changes per orders.  Changed to pioglitazone.  Discontinue metformin due to presumed side effect of nausea.  Diabetes will be reassessed in 6 months.

## 2020-01-24 DIAGNOSIS — E11.9 TYPE 2 DIABETES MELLITUS WITHOUT COMPLICATION, WITHOUT LONG-TERM CURRENT USE OF INSULIN (HCC): ICD-10-CM

## 2020-01-24 RX ORDER — METFORMIN HYDROCHLORIDE 500 MG/1
TABLET, EXTENDED RELEASE ORAL
Qty: 60 TABLET | Refills: 0 | OUTPATIENT
Start: 2020-01-24

## 2020-02-05 ENCOUNTER — OFFICE VISIT (OUTPATIENT)
Dept: ORTHOPEDIC SURGERY | Facility: CLINIC | Age: 55
End: 2020-02-05

## 2020-02-05 VITALS — OXYGEN SATURATION: 98 % | BODY MASS INDEX: 30.25 KG/M2 | HEART RATE: 68 BPM | HEIGHT: 62 IN | WEIGHT: 164.4 LBS

## 2020-02-05 DIAGNOSIS — M25.552 BILATERAL HIP PAIN: Primary | ICD-10-CM

## 2020-02-05 DIAGNOSIS — M25.551 BILATERAL HIP PAIN: Primary | ICD-10-CM

## 2020-02-05 DIAGNOSIS — G89.29 CHRONIC BILATERAL LOW BACK PAIN WITH BILATERAL SCIATICA: ICD-10-CM

## 2020-02-05 DIAGNOSIS — M54.41 CHRONIC BILATERAL LOW BACK PAIN WITH BILATERAL SCIATICA: ICD-10-CM

## 2020-02-05 DIAGNOSIS — M54.42 CHRONIC BILATERAL LOW BACK PAIN WITH BILATERAL SCIATICA: ICD-10-CM

## 2020-02-05 PROCEDURE — 99213 OFFICE O/P EST LOW 20 MIN: CPT | Performed by: ORTHOPAEDIC SURGERY

## 2020-02-05 NOTE — PROGRESS NOTES
[facility] [place] [facility] Note    Subjective     Chief Complaint   Patient presents with   • Left Hip - Pain   • Right Hip - Pain        HPI      Oseas Brasher is a 54 y.o. female who presents with bilateral hip pain.  The issue has been ongoing for 6 month(s). Pain is a 6/10 on the pain scale. Pain is described as aching, burning and shooting. Associated symptoms include pain, stiffness and same as prior visit. The pain is worse with walking, standing, sitting, climbing stairs, sleeping and working; nothing helps improve the pain. Previous treatments have included: NSAIDS, physical therapy and weight loss.    I have reviewed the following portions of the patient's history:History of Present Illness    Pain is located in the lower back area, and radiates down the posterior thigh to the foot.       Patient Active Problem List   Diagnosis   • Anemia   • Anxiety   • Gastroesophageal reflux disease without esophagitis   • Mixed hyperlipidemia   • Essential hypertension   • Type 2 diabetes mellitus without complication, without long-term current use of insulin (CMS/HCC)   • Cardiac murmur   • Sleep disturbance   • Class 1 obesity due to excess calories with serious comorbidity and body mass index (BMI) of 31.0 to 31.9 in adult     Past Medical History:   Diagnosis Date   • Allergic     dye used in CT scan, Z-pack   • Anemia    • Anxiety    • Bursitis, knee     acute right   • Chest pain     non cardiac   • Colonic polyp    • Depression    • Diabetes mellitus (CMS/HCC)    • Essential hypertension    • GERD (gastroesophageal reflux disease)    • Headache    • Hyperlipidemia    • IFG (impaired fasting glucose)    • Neoplasm of uncertain behavior of skin     left face   • Ovarian cyst    • Screening for breast cancer 10/14/2008   • Screening for colon cancer 10/14/2008   • Visit for routine gyn exam       Past Surgical History:   Procedure Laterality Date   • BREAST BIOPSY Right 11/20/2018    stereo   •  CHOLECYSTECTOMY      ~    • COLONOSCOPY     • KNEE SURGERY        Family History   Problem Relation Age of Onset   • Heart disease Father            • Cancer Father 77        Lung/   • Diabetes Father            • Hyperlipidemia Father            • Heart disease Paternal Grandmother            • Ovarian cancer Cousin 36   • Anxiety disorder Sister    • Arthritis Sister    • Depression Sister    • Diabetes Sister    • Hyperlipidemia Sister    • Mental illness Sister    • Anxiety disorder Sister    • Depression Sister    • Thyroid disease Sister    • Anxiety disorder Mother    • Arthritis Mother    • Depression Mother    • Diabetes Mother    • Hyperlipidemia Mother    • Miscarriages / Stillbirths Mother    • Stroke Mother    • Thyroid disease Mother    • Cancer Maternal Grandmother         Stomach   • Stroke Maternal Grandfather    • Breast cancer Neg Hx      Social History     Socioeconomic History   • Marital status: Single     Spouse name: Not on file   • Number of children: Not on file   • Years of education: Not on file   • Highest education level: Not on file   Tobacco Use   • Smoking status: Never Smoker   • Smokeless tobacco: Never Used   Substance and Sexual Activity   • Alcohol use: Yes     Types: 4 Glasses of wine, 8 Cans of beer, 2 Shots of liquor per week     Comment: varies   • Drug use: No   • Sexual activity: Never      Current Outpatient Medications on File Prior to Visit   Medication Sig Dispense Refill   • ALPRAZolam (XANAX) 0.5 MG tablet Take 0.5 mg by mouth.     • amLODIPine (NORVASC) 5 MG tablet Take 1 tablet by mouth Daily for 180 days. 30 tablet 5   • aspirin 81 MG tablet Take 81 mg by mouth daily.     • atorvastatin (LIPITOR) 20 MG tablet Take 1 tablet by mouth Every Night for 180 days. 30 tablet 5   • carvedilol (COREG) 12.5 MG tablet Take 1 tablet by mouth 2 (Two) Times a Day With Meals for 180 days. 60 tablet 5   • hydroCHLOROthiazide  (HYDRODIURIL) 25 MG tablet Take 1 tablet by mouth Daily for 180 days. 30 tablet 5   • Omega-3 Fatty Acids (FISH OIL PO) Take  by mouth Daily.     • omeprazole (priLOSEC) 40 MG capsule Take 1 capsule by mouth Every Night for 180 days. 30 capsule 5   • pioglitazone (ACTOS) 15 MG tablet Take 1 tablet by mouth Daily for 180 days. 30 tablet 5   • sertraline (ZOLOFT) 100 MG tablet Take 1 tablet by mouth Daily for 180 days. 30 tablet 5     No current facility-administered medications on file prior to visit.       Allergies   Allergen Reactions   • Iodinated Diagnostic Agents Hives   • Zithromax [Azithromycin] Palpitations        Review of Systems   Constitutional: Positive for activity change and fatigue. Negative for appetite change, chills, diaphoresis, fever and unexpected weight change.   HENT: Negative for congestion, dental problem, drooling, ear discharge, ear pain, facial swelling, hearing loss, mouth sores, nosebleeds, postnasal drip, rhinorrhea, sinus pressure, sneezing, sore throat, tinnitus, trouble swallowing and voice change.    Eyes: Negative for photophobia, pain, discharge, redness, itching and visual disturbance.   Respiratory: Negative for apnea, cough, choking, chest tightness, shortness of breath, wheezing and stridor.    Cardiovascular: Positive for palpitations. Negative for chest pain and leg swelling.   Gastrointestinal: Positive for nausea. Negative for abdominal distention, abdominal pain, anal bleeding, blood in stool, constipation, diarrhea, rectal pain and vomiting.   Endocrine: Negative for cold intolerance, heat intolerance, polydipsia, polyphagia and polyuria.   Genitourinary: Negative for decreased urine volume, difficulty urinating, dysuria, enuresis, flank pain, frequency, genital sores, hematuria and urgency.   Musculoskeletal: Positive for back pain and joint swelling. Negative for gait problem, myalgias, neck pain and neck stiffness.   Skin: Negative for color change, pallor, rash and  "wound.   Allergic/Immunologic: Negative for environmental allergies, food allergies and immunocompromised state.   Neurological: Positive for light-headedness and numbness. Negative for dizziness, tremors, seizures, syncope, facial asymmetry, speech difficulty, weakness and headaches.   Hematological: Negative for adenopathy. Does not bruise/bleed easily.   Psychiatric/Behavioral: Positive for sleep disturbance. Negative for agitation, behavioral problems, confusion, decreased concentration, dysphoric mood, hallucinations, self-injury and suicidal ideas. The patient is not nervous/anxious and is not hyperactive.         Objective      Physical Exam  Pulse 68   Ht 157.5 cm (62.01\")   Wt 74.6 kg (164 lb 6.4 oz)   LMP 06/20/2017 (Exact Date)   SpO2 98%   BMI 30.06 kg/m²     Body mass index is 30.06 kg/m².    General:   Mental Status:  Alert   Appearance: Cooperative, in no acute distress   Build and Nutrition: Overweight female   Orientation: Alert and oriented to person, place and time   Posture: Normal   Gait: Normal    Integument:   Right hip: No skin lesions, no rash, no ecchymosis   Left hip: No skin lesions, no rash, no ecchymosis    Neurologic:   Sensation:    Right foot: Intact to light touch on the dorsal and plantar aspect    Left foot: Intact to light touch on the dorsal and plantar aspect   Motor:  Right lower extremity: 5/5 quadriceps, hamstrings, ankle dorsiflexors, and ankle plantar flexors  Left lower extremity: 5/5 quadriceps, hamstrings, ankle dorsiflexors, and ankle plantar flexors  Vascular:   Right lower extremity: 2+ dorsalis pedis pulse, prompt capillary refill   Left lower extremity: 2+ dorsalis pedis pulse, prompt capillary refill    Lower Extremities:   Right Hip:    Tenderness:  None    Swelling:  None    Crepitus: None    Atrophy:  None    Range of motion:  External Rotation: 30°       Internal Rotation: 30°       Flexion:  100°       Extension:  0°   Instability:  None  Deformities: "  None  Functional testing: Negative Stinchfield  No leg length discrepancy   Left Hip:    Tenderness:  None    Swelling:  None    Crepitus:  None    Atrophy:  None    Range of motion:  External Rotation: 30°       Internal Rotation: 30°       Flexion:  100°       Extension:  0°   Instability:  None  Deformities:  None  Functional testing: Negative Stinchfield    No leg length discrepancy      Imaging/Studies  Imaging Results (Last 24 Hours)     Procedure Component Value Units Date/Time    XR Hips Bilateral With or Without Pelvis 2 View [[id number]] Resulted:  02/05/20 1209     Updated:  02/05/20 1210    Narrative:       Right Hip Radiographs  Indication: right hip pain  Views: low AP pelvis and lateral of the right hip    Comparison: no prior studies available for review    Findings:   No acute or chronic bony abnormalities with normal alignment.    Left Hip Radiographs  Indication: left hip pain  Views: low AP pelvis and lateral of the left hip    Comparison: no prior studies available for review    Findings:   No acute or chronic bony abnormalities with normal alignment.              Assessment and Plan     Oseas was seen today for pain and pain.    Diagnoses and all orders for this visit:    Bilateral hip pain  -     XR Hips Bilateral With or Without Pelvis 2 View    Chronic bilateral low back pain with bilateral sciatica  -     Ambulatory Referral to Orthopedic Surgery        1. Bilateral hip pain    2. Chronic bilateral low back pain with bilateral sciatica        I reviewed my findings with the patient today.  Findings are most likely consistent with sciatica, and I see no intrinsic hip problems.  We will refer her to a spine specialist, and I will be happy to see her back in the future should it be more hip oriented.        Return for Referral to spine specialist.      Medical Decision Making  Data/Risk: radiology tests and independent visualization of imaging, lab tests, or EMG/NCV      Clyde Coyle,  MD  02/05/20  12:44 PM

## 2020-05-29 ENCOUNTER — RESULTS ENCOUNTER (OUTPATIENT)
Dept: FAMILY MEDICINE CLINIC | Facility: CLINIC | Age: 55
End: 2020-05-29

## 2020-05-29 DIAGNOSIS — I10 ESSENTIAL HYPERTENSION: ICD-10-CM

## 2020-05-29 DIAGNOSIS — E78.2 MIXED HYPERLIPIDEMIA: ICD-10-CM

## 2020-05-29 DIAGNOSIS — E11.9 TYPE 2 DIABETES MELLITUS WITHOUT COMPLICATION, WITHOUT LONG-TERM CURRENT USE OF INSULIN (HCC): ICD-10-CM

## 2020-07-24 ENCOUNTER — LAB (OUTPATIENT)
Dept: LAB | Facility: HOSPITAL | Age: 55
End: 2020-07-24

## 2020-07-24 DIAGNOSIS — I10 ESSENTIAL HYPERTENSION, MALIGNANT: Primary | ICD-10-CM

## 2020-07-24 LAB
ALBUMIN SERPL-MCNC: 4.6 G/DL (ref 3.5–5.2)
ALBUMIN/GLOB SERPL: 1.5 G/DL
ALP SERPL-CCNC: 78 U/L (ref 39–117)
ALT SERPL W P-5'-P-CCNC: 15 U/L (ref 1–33)
ANION GAP SERPL CALCULATED.3IONS-SCNC: 14.4 MMOL/L (ref 5–15)
AST SERPL-CCNC: 20 U/L (ref 1–32)
BASOPHILS # BLD AUTO: 0.06 10*3/MM3 (ref 0–0.2)
BASOPHILS NFR BLD AUTO: 1.1 % (ref 0–1.5)
BILIRUB SERPL-MCNC: 0.3 MG/DL (ref 0–1.2)
BUN SERPL-MCNC: 9 MG/DL (ref 6–20)
BUN/CREAT SERPL: 19.1 (ref 7–25)
CALCIUM SPEC-SCNC: 10.2 MG/DL (ref 8.6–10.5)
CHLORIDE SERPL-SCNC: 102 MMOL/L (ref 98–107)
CHOLEST SERPL-MCNC: 146 MG/DL (ref 0–200)
CK SERPL-CCNC: 63 U/L (ref 20–180)
CO2 SERPL-SCNC: 24.6 MMOL/L (ref 22–29)
CREAT SERPL-MCNC: 0.47 MG/DL (ref 0.57–1)
DEPRECATED RDW RBC AUTO: 44.6 FL (ref 37–54)
EOSINOPHIL # BLD AUTO: 0.14 10*3/MM3 (ref 0–0.4)
EOSINOPHIL NFR BLD AUTO: 2.5 % (ref 0.3–6.2)
ERYTHROCYTE [DISTWIDTH] IN BLOOD BY AUTOMATED COUNT: 13.9 % (ref 12.3–15.4)
GFR SERPL CREATININE-BSD FRML MDRD: 137 ML/MIN/1.73
GLOBULIN UR ELPH-MCNC: 3.1 GM/DL
GLUCOSE SERPL-MCNC: 115 MG/DL (ref 65–99)
HBA1C MFR BLD: 5.9 % (ref 4.8–5.6)
HCT VFR BLD AUTO: 37.1 % (ref 34–46.6)
HDLC SERPL QL: 2.12
HDLC SERPL-MCNC: 69 MG/DL (ref 40–60)
HGB BLD-MCNC: 12.7 G/DL (ref 12–15.9)
IMM GRANULOCYTES # BLD AUTO: 0.03 10*3/MM3 (ref 0–0.05)
IMM GRANULOCYTES NFR BLD AUTO: 0.5 % (ref 0–0.5)
LDLC SERPL CALC-MCNC: 53 MG/DL (ref 0–100)
LYMPHOCYTES # BLD AUTO: 1.73 10*3/MM3 (ref 0.7–3.1)
LYMPHOCYTES NFR BLD AUTO: 30.9 % (ref 19.6–45.3)
MCH RBC QN AUTO: 30.4 PG (ref 26.6–33)
MCHC RBC AUTO-ENTMCNC: 34.2 G/DL (ref 31.5–35.7)
MCV RBC AUTO: 88.8 FL (ref 79–97)
MONOCYTES # BLD AUTO: 0.37 10*3/MM3 (ref 0.1–0.9)
MONOCYTES NFR BLD AUTO: 6.6 % (ref 5–12)
NEUTROPHILS NFR BLD AUTO: 3.26 10*3/MM3 (ref 1.7–7)
NEUTROPHILS NFR BLD AUTO: 58.4 % (ref 42.7–76)
NRBC BLD AUTO-RTO: 0 /100 WBC (ref 0–0.2)
PLATELET # BLD AUTO: 271 10*3/MM3 (ref 140–450)
PMV BLD AUTO: 11.3 FL (ref 6–12)
POTASSIUM SERPL-SCNC: 3.8 MMOL/L (ref 3.5–5.2)
PROT SERPL-MCNC: 7.7 G/DL (ref 6–8.5)
RBC # BLD AUTO: 4.18 10*6/MM3 (ref 3.77–5.28)
SODIUM SERPL-SCNC: 141 MMOL/L (ref 136–145)
TRIGL SERPL-MCNC: 119 MG/DL (ref 0–150)
TSH SERPL DL<=0.05 MIU/L-ACNC: 2.26 UIU/ML (ref 0.27–4.2)
VLDLC SERPL-MCNC: 23.8 MG/DL
WBC # BLD AUTO: 5.59 10*3/MM3 (ref 3.4–10.8)

## 2020-07-24 PROCEDURE — 80061 LIPID PANEL: CPT

## 2020-07-24 PROCEDURE — 83036 HEMOGLOBIN GLYCOSYLATED A1C: CPT

## 2020-07-24 PROCEDURE — 80053 COMPREHEN METABOLIC PANEL: CPT

## 2020-07-24 PROCEDURE — 84443 ASSAY THYROID STIM HORMONE: CPT

## 2020-07-24 PROCEDURE — 82043 UR ALBUMIN QUANTITATIVE: CPT

## 2020-07-24 PROCEDURE — 36415 COLL VENOUS BLD VENIPUNCTURE: CPT | Performed by: FAMILY MEDICINE

## 2020-07-24 PROCEDURE — 82550 ASSAY OF CK (CPK): CPT

## 2020-07-24 PROCEDURE — 85025 COMPLETE CBC W/AUTO DIFF WBC: CPT

## 2020-07-25 LAB — ALBUMIN UR-MCNC: <1.2 MG/DL

## 2020-07-27 ENCOUNTER — TELEPHONE (OUTPATIENT)
Dept: URGENT CARE | Facility: CLINIC | Age: 55
End: 2020-07-27

## 2020-07-27 NOTE — TELEPHONE ENCOUNTER
----- Message from Carson Ayala MD sent at 7/27/2020  8:22 AM EDT -----  Your x-ray is within normal limits.  If there are continued symptoms follow-up will be recommended.  Carson Ayala MD

## 2020-07-29 ENCOUNTER — OFFICE VISIT (OUTPATIENT)
Dept: FAMILY MEDICINE CLINIC | Facility: CLINIC | Age: 55
End: 2020-07-29

## 2020-07-29 VITALS
WEIGHT: 180 LBS | DIASTOLIC BLOOD PRESSURE: 86 MMHG | SYSTOLIC BLOOD PRESSURE: 133 MMHG | HEART RATE: 87 BPM | RESPIRATION RATE: 16 BRPM | HEIGHT: 62 IN | TEMPERATURE: 97.7 F | OXYGEN SATURATION: 98 % | BODY MASS INDEX: 33.13 KG/M2

## 2020-07-29 DIAGNOSIS — M79.672 LEFT FOOT PAIN: ICD-10-CM

## 2020-07-29 DIAGNOSIS — F41.9 ANXIETY: ICD-10-CM

## 2020-07-29 DIAGNOSIS — K21.9 GASTROESOPHAGEAL REFLUX DISEASE WITHOUT ESOPHAGITIS: ICD-10-CM

## 2020-07-29 DIAGNOSIS — E78.2 MIXED HYPERLIPIDEMIA: ICD-10-CM

## 2020-07-29 DIAGNOSIS — E66.09 CLASS 1 OBESITY DUE TO EXCESS CALORIES WITH SERIOUS COMORBIDITY AND BODY MASS INDEX (BMI) OF 32.0 TO 32.9 IN ADULT: Primary | ICD-10-CM

## 2020-07-29 DIAGNOSIS — E11.9 TYPE 2 DIABETES MELLITUS WITHOUT COMPLICATION, WITHOUT LONG-TERM CURRENT USE OF INSULIN (HCC): ICD-10-CM

## 2020-07-29 DIAGNOSIS — I10 ESSENTIAL HYPERTENSION: ICD-10-CM

## 2020-07-29 PROCEDURE — 99214 OFFICE O/P EST MOD 30 MIN: CPT | Performed by: FAMILY MEDICINE

## 2020-07-29 RX ORDER — SERTRALINE HYDROCHLORIDE 100 MG/1
100 TABLET, FILM COATED ORAL DAILY
Qty: 30 TABLET | Refills: 5 | Status: SHIPPED | OUTPATIENT
Start: 2020-07-29 | End: 2021-01-27 | Stop reason: SDUPTHER

## 2020-07-29 RX ORDER — AMLODIPINE BESYLATE 5 MG/1
5 TABLET ORAL DAILY
Qty: 30 TABLET | Refills: 5 | Status: SHIPPED | OUTPATIENT
Start: 2020-07-29 | End: 2021-01-27 | Stop reason: SDUPTHER

## 2020-07-29 RX ORDER — ATORVASTATIN CALCIUM 20 MG/1
20 TABLET, FILM COATED ORAL NIGHTLY
Qty: 30 TABLET | Refills: 5 | Status: SHIPPED | OUTPATIENT
Start: 2020-07-29 | End: 2021-01-27 | Stop reason: SDUPTHER

## 2020-07-29 RX ORDER — CARVEDILOL 12.5 MG/1
12.5 TABLET ORAL 2 TIMES DAILY WITH MEALS
Qty: 60 TABLET | Refills: 5 | Status: SHIPPED | OUTPATIENT
Start: 2020-07-29 | End: 2021-01-27 | Stop reason: SDUPTHER

## 2020-07-29 RX ORDER — OMEPRAZOLE 40 MG/1
40 CAPSULE, DELAYED RELEASE ORAL NIGHTLY
Qty: 30 CAPSULE | Refills: 5 | Status: SHIPPED | OUTPATIENT
Start: 2020-07-29 | End: 2021-01-27 | Stop reason: SDUPTHER

## 2020-07-29 RX ORDER — HYDROCHLOROTHIAZIDE 25 MG/1
25 TABLET ORAL DAILY
Qty: 30 TABLET | Refills: 5 | Status: SHIPPED | OUTPATIENT
Start: 2020-07-29 | End: 2021-01-27 | Stop reason: SDUPTHER

## 2020-07-29 NOTE — ASSESSMENT & PLAN NOTE
New problem.  Patient to continue nonweightbearing.  Recheck 1 week.  If symptoms continue, then may need to consider MRI to rule out occult stress fracture.  Letter given to stay off work until released.

## 2020-07-29 NOTE — PROGRESS NOTES
"   Subjective       Chief Complaint   Patient presents with   • Labs Only     6 mo    • Hypertension     med refill          HPI:       HPI   Patient presents the office to review labs and refill medicines.  Blood pressure control.  Lipids are to goal.  Diabetes is controlled.  No medicine side effects reported.    Patient has injured her left foot since Sunday and is currently off work.  She was evaluated at the urgent care center and no fracture identified but some calcific tendinitis.  She was placed in immobilization boot which she had previously.  Her symptoms have improved mildly.  She is still nonweightbearing.    She will set up an appointment in the future to discuss letter for her job regarding COVID-19 risks.    Review of Systems   Cardiovascular: Positive for palpitations. Negative for chest pain.   Musculoskeletal:        See HPI        I have reviewed and confirmed the accuracy of the ROS as documented by myself or MA/LPN/RN Hunter Sánchez MD          PE:   Objective   /86   Pulse 87   Temp 97.7 °F (36.5 °C) (Tympanic)   Resp 16   Ht 157.5 cm (62\")   Wt 81.6 kg (180 lb) Comment: boot  LMP 06/20/2017 (Exact Date)   SpO2 98%   BMI 32.92 kg/m²     Body mass index is 32.92 kg/m².    Physical Exam   Constitutional: She is oriented to person, place, and time. She appears well-developed. No distress.   Eyes: Conjunctivae and lids are normal.   Neck: Carotid bruit is not present.   Cardiovascular: Normal rate, regular rhythm and normal heart sounds.   Pulmonary/Chest: Effort normal and breath sounds normal.        Neurological: She is alert and oriented to person, place, and time.   Skin: Skin is warm and dry.   Psychiatric: She has a normal mood and affect. Her behavior is normal.   Vitals reviewed.         Data Reviewed:   Xr Foot 3+ View Left    Result Date: 7/26/2020  Impression: Probable degenerative changes as noted including suspected tendon calcification at the base of the fifth " metatarsal. Acute avulsion here is considered unlikely. No evidence of acute bony trauma or findings suspicious for stress fracture are seen elsewhere. Consider follow-up imaging if patient's symptoms persist or worsen.   DICTATED:   07/26/2020 EDITED/ls :   07/26/2020  This report was finalized on 7/26/2020 10:16 PM by Dr. Tushar Arango MD.                  A/P:     Assessment/Plan   Diagnoses and all orders for this visit:    1. Class 1 obesity due to excess calories with serious comorbidity and body mass index (BMI) of 32.0 to 32.9 in adult (Primary)  Assessment & Plan:  Obesity is worsening.  Discussed the patient's BMI.  The BMI is above average; BMI management plan is completed.  General weight loss/lifestyle modification strategies discussed (elicit support from others; identify saboteurs; non-food rewards, etc).  Diet interventions: moderate (500 kCal/d) deficit diet.      2. Essential hypertension  Assessment & Plan:  Hypertension is unchanged.  Continue current treatment regimen.  Blood pressure will be reassessed at the next regular appointment.    Orders:  -     amLODIPine (NORVASC) 5 MG tablet; Take 1 tablet by mouth Daily for 180 days.  Dispense: 30 tablet; Refill: 5  -     carvedilol (COREG) 12.5 MG tablet; Take 1 tablet by mouth 2 (Two) Times a Day With Meals for 180 days.  Dispense: 60 tablet; Refill: 5  -     hydroCHLOROthiazide (HYDRODIURIL) 25 MG tablet; Take 1 tablet by mouth Daily for 180 days.  Dispense: 30 tablet; Refill: 5    3. Mixed hyperlipidemia  Assessment & Plan:  Lipid abnormalities are unchanged.  Pharmacotherapy as ordered.  Lipids will be reassessed in 6 months.    Orders:  -     atorvastatin (LIPITOR) 20 MG tablet; Take 1 tablet by mouth Every Night for 180 days.  Dispense: 30 tablet; Refill: 5    4. Gastroesophageal reflux disease without esophagitis  Assessment & Plan:  The current medical regimen is effective;  continue present plan and medications.      Orders:  -     omeprazole  (priLOSEC) 40 MG capsule; Take 1 capsule by mouth Every Night for 180 days.  Dispense: 30 capsule; Refill: 5    5. Anxiety  Assessment & Plan:  The current medical regimen is effective;  continue present plan and medications.      Orders:  -     sertraline (ZOLOFT) 100 MG tablet; Take 1 tablet by mouth Daily for 180 days.  Dispense: 30 tablet; Refill: 5    6. Type 2 diabetes mellitus without complication, without long-term current use of insulin (CMS/LTAC, located within [facility])  Assessment & Plan:  Diabetes is unchanged.   Continue current treatment regimen.  Diabetes will be reassessed in 6 months.      7. Left foot pain  Assessment & Plan:  New problem.  Patient to continue nonweightbearing.  Recheck 1 week.  If symptoms continue, then may need to consider MRI to rule out occult stress fracture.  Letter given to stay off work until released.          Follow up:    Return in about 1 week (around 8/5/2020) for left foot, letter for work for covid.

## 2020-08-06 ENCOUNTER — OFFICE VISIT (OUTPATIENT)
Dept: FAMILY MEDICINE CLINIC | Facility: CLINIC | Age: 55
End: 2020-08-06

## 2020-08-06 VITALS
DIASTOLIC BLOOD PRESSURE: 85 MMHG | HEIGHT: 62 IN | SYSTOLIC BLOOD PRESSURE: 145 MMHG | WEIGHT: 173 LBS | OXYGEN SATURATION: 98 % | TEMPERATURE: 96.6 F | HEART RATE: 90 BPM | RESPIRATION RATE: 16 BRPM | BODY MASS INDEX: 31.83 KG/M2

## 2020-08-06 DIAGNOSIS — M79.672 LEFT FOOT PAIN: Primary | ICD-10-CM

## 2020-08-06 DIAGNOSIS — Z71.89 OTHER REASONS FOR SEEKING CONSULTATION: ICD-10-CM

## 2020-08-06 PROCEDURE — 99214 OFFICE O/P EST MOD 30 MIN: CPT | Performed by: FAMILY MEDICINE

## 2020-08-06 NOTE — PROGRESS NOTES
"   Subjective       Chief Complaint   Patient presents with   • Foot Pain     follow up          HPI:       HPI   Patient is here to follow-up on left foot injury.  She is doing better but her pain intensity is still 3 out of 10 with weightbearing.  It is overall improving.  We will continue with rest.  She is okay to try walking a little bit at home and we will follow-up with her next week.  Continue off work.  25 minutes spent during today's visit  She is also here for letter for her job to be away from Covid risk due to her secondary issues.  Letter has been created.    Review of Systems   Musculoskeletal:        See HPI        I have reviewed and confirmed the accuracy of the ROS as documented by myself or MA/LPN/RN Hunter Sánchez MD          PE:   Objective   /85   Pulse 90   Temp 96.6 °F (35.9 °C) (Tympanic)   Resp 16   Ht 157.5 cm (62\")   Wt 78.5 kg (173 lb)   LMP 06/20/2017 (Exact Date)   SpO2 98%   BMI 31.64 kg/m²     Body mass index is 31.64 kg/m².    Physical Exam   Constitutional: She appears well-developed. No distress.               Data Reviewed:   Xr Foot 3+ View Left    Result Date: 7/26/2020  Impression: Probable degenerative changes as noted including suspected tendon calcification at the base of the fifth metatarsal. Acute avulsion here is considered unlikely. No evidence of acute bony trauma or findings suspicious for stress fracture are seen elsewhere. Consider follow-up imaging if patient's symptoms persist or worsen.   DICTATED:   07/26/2020 EDITED/ls :   07/26/2020  This report was finalized on 7/26/2020 10:16 PM by Dr. Tushar Arango MD.                  A/P:     Assessment/Plan   Diagnoses and all orders for this visit:    1. Left foot pain (Primary)  Comments:  Continued pain.  Check MRI.  Continue off work.  Recheck next week  Orders:  -     MRI Foot Left Without Contrast; Future    2. Other reasons for seeking consultation  Comments:  Letter created to avoid close exposure to " COVID-19 patients at work.  Due to secondary risks of being diabetic.          Follow up:    Return in about 1 week (around 8/13/2020).

## 2020-08-13 ENCOUNTER — OFFICE VISIT (OUTPATIENT)
Dept: FAMILY MEDICINE CLINIC | Facility: CLINIC | Age: 55
End: 2020-08-13

## 2020-08-13 VITALS
WEIGHT: 178 LBS | OXYGEN SATURATION: 98 % | SYSTOLIC BLOOD PRESSURE: 133 MMHG | BODY MASS INDEX: 32.76 KG/M2 | HEART RATE: 62 BPM | DIASTOLIC BLOOD PRESSURE: 82 MMHG | HEIGHT: 62 IN | RESPIRATION RATE: 16 BRPM | TEMPERATURE: 97.1 F

## 2020-08-13 DIAGNOSIS — M79.672 LEFT FOOT PAIN: Primary | ICD-10-CM

## 2020-08-13 DIAGNOSIS — S92.302D CLOSED AVULSION FRACTURE OF METATARSAL BONE OF LEFT FOOT WITH ROUTINE HEALING, SUBSEQUENT ENCOUNTER: ICD-10-CM

## 2020-08-13 PROBLEM — S92.302A CLOSED AVULSION FRACTURE OF METATARSAL BONE OF LEFT FOOT: Status: ACTIVE | Noted: 2020-08-13

## 2020-08-13 PROCEDURE — 73630 X-RAY EXAM OF FOOT: CPT | Performed by: FAMILY MEDICINE

## 2020-08-13 PROCEDURE — 99214 OFFICE O/P EST MOD 30 MIN: CPT | Performed by: FAMILY MEDICINE

## 2020-08-13 RX ORDER — MELOXICAM 15 MG/1
15 TABLET ORAL DAILY
Qty: 30 TABLET | Refills: 0 | Status: SHIPPED | OUTPATIENT
Start: 2020-08-13 | End: 2020-09-12

## 2020-08-13 NOTE — ASSESSMENT & PLAN NOTE
Condition unchanged.  Continue off work.  Her supervisor has kept her out of work with instructions to not come back to work until she is fully cleared.  FMLA papers filled out today.  Note sent to my chart to remain off work.  Further work instructions per orthopedist.  changed ibuprofen to meloxicam.

## 2020-08-13 NOTE — PROGRESS NOTES
"   Subjective       Chief Complaint   Patient presents with   • Foot Pain         HPI:       This patient presents for left foot pain.  It continues.  No change.  She still gets pain soon after any weightbearing.  She was unable to get MRI in [place] until the 26th of we will try to schedule this earlier in [place].  She remains off work.  Pain is still located lateral aspect of foot on the left.  Pain can be sharp at times.  FMLA papers filled out today. 25 minutes spent face to face, filling out papers, creating work note, etc.   History of Present Illness       Review of Systems   Musculoskeletal:        Left foot pain      I have reviewed the ROS as documented above. Hunter Sánchez MD              PE:   Objective   /82   Pulse 62   Temp 97.1 °F (36.2 °C) (Tympanic)   Resp 16   Ht 157.5 cm (62\")   Wt 80.7 kg (178 lb)   LMP 06/20/2017 (Exact Date)   SpO2 98%   BMI 32.56 kg/m²  Body mass index is 32.56 kg/m².    Physical Exam   Constitutional: She appears well-developed. No distress.   Musculoskeletal:   Continued tenderness along anterior lateral side of left foot.  No step-off fracture noted.  No crepitus.  No skin color changes noted.       Procedures      Data Reviewed:   Encounter Date that the following test/s were ordered and reviewed by Hunter Sánchez MD: 08/13/2020     Xray test ordered: Left Foot  Views: lateral, posterior-anterior and oblique    Relevant Clinical Issues/Diagnoses/Indications for XRay: see HPI  Clinical Findings: Extremities: Foot: avulsion fracture left fifth metatarsal base    Compared with previous XRay? no    Date of Previous Xray:No previous xray available for comparison    Changes on current Xray? not applicable                A/P:     Assessment/Plan   Diagnoses and all orders for this visit:    1. Left foot pain (Primary)  Assessment & Plan:  Condition unchanged.  Continue off work.  Her supervisor has kept her out of work with instructions to not come back to " work until she is fully cleared.  FMLA papers filled out today.  Note sent to my chart to remain off work.  Further work instructions per orthopedist.  changed ibuprofen to meloxicam.      Orders:  -     XR Foot 3+ View Left (In Office)  -     meloxicam (MOBIC) 15 MG tablet; Take 1 tablet by mouth Daily for 30 days.  Dispense: 30 tablet; Refill: 0    2. Closed avulsion fracture of metatarsal bone of left foot with routine healing, subsequent encounter  Assessment & Plan:  Referral to orthopedist.  Nonweightbearing.  Stay off work.  Switch to meloxicam.    Orders:  -     Ambulatory Referral to Orthopedic Surgery  Patient to remain off work until released by orthopedist      Follow up:    Return if symptoms worsen or fail to improve.

## 2020-08-26 ENCOUNTER — HOSPITAL ENCOUNTER (OUTPATIENT)
Dept: MRI IMAGING | Facility: HOSPITAL | Age: 55
Discharge: HOME | End: 2020-08-26
Admitting: FAMILY MEDICINE

## 2020-08-26 DIAGNOSIS — M79.672 LEFT FOOT PAIN: ICD-10-CM

## 2020-08-26 PROCEDURE — 73718 MRI LOWER EXTREMITY W/O DYE: CPT

## 2020-08-28 ENCOUNTER — OFFICE VISIT (OUTPATIENT)
Dept: ORTHOPEDIC SURGERY | Facility: CLINIC | Age: 55
End: 2020-08-28

## 2020-08-28 VITALS — HEIGHT: 62 IN | WEIGHT: 173.06 LBS | OXYGEN SATURATION: 97 % | BODY MASS INDEX: 31.85 KG/M2 | HEART RATE: 85 BPM

## 2020-08-28 DIAGNOSIS — M19.072 OSTEOARTHRITIS OF LEFT ANKLE OR FOOT: ICD-10-CM

## 2020-08-28 DIAGNOSIS — D49.2 BONE TUMOR: Primary | ICD-10-CM

## 2020-08-28 PROBLEM — M19.079 OSTEOARTHRITIS OF ANKLE OR FOOT: Status: ACTIVE | Noted: 2020-08-28

## 2020-08-28 PROCEDURE — 99214 OFFICE O/P EST MOD 30 MIN: CPT | Performed by: ORTHOPAEDIC SURGERY

## 2020-08-28 RX ORDER — PIOGLITAZONEHYDROCHLORIDE 15 MG/1
15 TABLET ORAL DAILY
COMMUNITY
End: 2020-12-28 | Stop reason: SDUPTHER

## 2020-08-31 ENCOUNTER — OFFICE VISIT (OUTPATIENT)
Dept: FAMILY MEDICINE CLINIC | Facility: CLINIC | Age: 55
End: 2020-08-31

## 2020-08-31 ENCOUNTER — DOCUMENTATION (OUTPATIENT)
Dept: FAMILY MEDICINE CLINIC | Facility: CLINIC | Age: 55
End: 2020-08-31

## 2020-08-31 VITALS
SYSTOLIC BLOOD PRESSURE: 127 MMHG | RESPIRATION RATE: 16 BRPM | HEIGHT: 62 IN | BODY MASS INDEX: 33.86 KG/M2 | DIASTOLIC BLOOD PRESSURE: 82 MMHG | WEIGHT: 184 LBS | HEART RATE: 68 BPM | TEMPERATURE: 96.9 F | OXYGEN SATURATION: 98 %

## 2020-08-31 DIAGNOSIS — M19.072 OSTEOARTHRITIS OF LEFT ANKLE OR FOOT: Primary | ICD-10-CM

## 2020-08-31 DIAGNOSIS — R93.89 ABNORMAL MRI: ICD-10-CM

## 2020-08-31 PROBLEM — S92.302A CLOSED AVULSION FRACTURE OF METATARSAL BONE OF LEFT FOOT: Status: RESOLVED | Noted: 2020-08-13 | Resolved: 2020-08-31

## 2020-08-31 PROCEDURE — 99214 OFFICE O/P EST MOD 30 MIN: CPT | Performed by: FAMILY MEDICINE

## 2020-08-31 NOTE — ASSESSMENT & PLAN NOTE
EXAMINATION: MRI FOOT, LEFT, WO CONTRAST-08/26/2020:      INDICATION: Foot trauma, [place] rules positive, fx suspected, first  study; M79.284-Pain in left foot.     TECHNIQUE: Sagittal T1 and STIR, axial and coronal T1 and T2 fat-sat fat  and water weighted sequences of the left foot.     COMPARISON: Left foot plain films 07/26/2020.     FINDINGS: History indicates lateral left foot pain and tenderness.  Avulsion of fifth metatarsal base.     No significant edema is associated with the fifth metatarsal base to  suggest the patient's small calcification here on plain film is due to  acute or recent avulsion. There is moderate midfoot DJD at the level of  the first naviculocuneiform joint. There is mild-to-moderate DJD of the  first MTP joint. There appear to be only mild degenerative changes  elsewhere in the midfoot. No marrow edema is seen to suggest fracture.  There is a rather patchy appearance throughout the marrow of the distal  tibia, with low T1 and high T2 signal, of uncertain significance. There  is no obvious cortical expansion or erosion. This is less well defined  than seen with bony infarct and does not appear to represent a discrete  mass. Considerations would include conversion to red marrow, hemangioma  and infiltrating tumor. This does not have the usual appearance of an  enchondroma or infarct. Consider evaluation with nuclear medicine bone  scan and/or referral to bone tumor specialist. No similar findings are  seen in the bones of the foot, or in the fibula.     Distal extensor and flexor tendons appear intact. No ganglion cyst or  mass is appreciated. Flexor/ extensor, and peroneal tendons appear to be  intact.     IMPRESSION:  1.  Moderate medial midfoot DJD. No evidence of acute bony trauma is  seen.      2.  Patchy diffuse abnormal marrow signal of the distal tibia of  uncertain significance as discussed above.     D:  08/28/2020  E:  08/28/2020

## 2020-08-31 NOTE — ASSESSMENT & PLAN NOTE
Osteo-arthritis as detailed in the MRI report.  As per orthopedist, agree with work shifts at 8 or less hours per day.  Hopefully she can be reassigned to a post that requires less continuous standing and walking.  [place] papers filled out.

## 2020-08-31 NOTE — PROGRESS NOTES
"   Subjective       Chief Complaint   Patient presents with   • FMLA         HPI:       This patient presents for recent visit with orthopedist.  MRI showed no stress fracture but did show osteoarthritis of left foot and ankle.  Discussion with orthopedist with our nurse practitioner has been documented.  Suggestion of FMLA to reduce work shift 8 hours and having intermittently for flareups likely needed.  Incidental finding of distal tibia marrow abnormality on MRI is currently being worked up to rule out bone tumor.  Pain is still present with weightbearing left ankle foot.  Currently she has some pain that is radiating up the calf.25 minutes spent face to face and reviewing chart for MRI results, reviewing discussion from orthopedist and filling out FMLA papers.   History of Present Illness       Review of Systems   Respiratory: Negative for shortness of breath.    Cardiovascular: Negative for chest pain.   Musculoskeletal:        See HPI      I have reviewed the ROS as documented above. Hunter Sánchez MD              PE:   Objective   /82   Pulse 68   Temp 96.9 °F (36.1 °C) (Tympanic)   Resp 16   Ht 157.5 cm (62.01\")   Wt 83.5 kg (184 lb)   LMP 06/20/2017 (Exact Date)   SpO2 98%   BMI 33.64 kg/m²  Body mass index is 33.64 kg/m².    Physical Exam   Constitutional: She appears well-developed. No distress.   Musculoskeletal: She exhibits no edema.   Guarded gait due to left foot pain       Procedures      Data Reviewed:   Mri Foot Left Without Contrast    Result Date: 8/29/2020  Impression: 1.  Moderate medial midfoot DJD. No evidence of acute bony trauma is seen.  2.  Patchy diffuse abnormal marrow signal of the distal tibia of uncertain significance as discussed above.  D:  08/28/2020 E:  08/28/2020  This report was finalized on 8/29/2020 5:43 PM by Dr. Tushar Arango MD.                  A/P:     Assessment/Plan   Diagnoses and all orders for this visit:    1. Osteoarthritis of left ankle or foot " (Primary)  Assessment & Plan:  Osteo-arthritis as detailed in the MRI report.  As per orthopedist, agree with work shifts at 8 or less hours per day.  Hopefully she can be reassigned to a post that requires less continuous standing and walking.  LA papers filled out.    Orders:  -     Ambulatory Referral to Rheumatology    2. Abnormal MRI  Assessment & Plan:  EXAMINATION: MRI FOOT, LEFT, WO CONTRAST-08/26/2020:      INDICATION: Foot trauma, [place] rules positive, fx suspected, first  study; M79.672-Pain in left foot.     TECHNIQUE: Sagittal T1 and STIR, axial and coronal T1 and T2 fat-sat fat  and water weighted sequences of the left foot.     COMPARISON: Left foot plain films 07/26/2020.     FINDINGS: History indicates lateral left foot pain and tenderness.  Avulsion of fifth metatarsal base.     No significant edema is associated with the fifth metatarsal base to  suggest the patient's small calcification here on plain film is due to  acute or recent avulsion. There is moderate midfoot DJD at the level of  the first naviculocuneiform joint. There is mild-to-moderate DJD of the  first MTP joint. There appear to be only mild degenerative changes  elsewhere in the midfoot. No marrow edema is seen to suggest fracture.  There is a rather patchy appearance throughout the marrow of the distal  tibia, with low T1 and high T2 signal, of uncertain significance. There  is no obvious cortical expansion or erosion. This is less well defined  than seen with bony infarct and does not appear to represent a discrete  mass. Considerations would include conversion to red marrow, hemangioma  and infiltrating tumor. This does not have the usual appearance of an  enchondroma or infarct. Consider evaluation with nuclear medicine bone  scan and/or referral to bone tumor specialist. No similar findings are  seen in the bones of the foot, or in the fibula.     Distal extensor and flexor tendons appear intact. No ganglion cyst or  mass is  appreciated. Flexor/ extensor, and peroneal tendons appear to be  intact.     IMPRESSION:  1.  Moderate medial midfoot DJD. No evidence of acute bony trauma is  seen.      2.  Patchy diffuse abnormal marrow signal of the distal tibia of  uncertain significance as discussed above.     D:  08/28/2020  E:  08/28/2020    Orders:  -     Ambulatory Referral to Rheumatology        Follow up:    Return if symptoms worsen or fail to improve.

## 2020-08-31 NOTE — PROGRESS NOTES
See Note below. Call patient and have her schedule appointment with me to finalize some details on fmla and possible referrals. Thanks, Dr. Sánchez    ===View-only below this line===    ----- Message -----  From: Savi Sandra APRN  Sent: 8/28/2020  10:10 AM EDT  To: Hunter Sánchez MD  Subject: FMLA                                             I spoke to orthopedist Dr. Huizar about this patient.  She states the patient did not have a fracture, but has significant arthritis in her foot. She was wanting to discuss with you the FMLA in future.  Patient works as a  and is on her feet walking for 10 - 12 hours.  She does not feel the patient will be able to do that long-term and that ultimately job change may be required.  Obviously that may not be doable for the patient.  In the meantime, reducing shifts to 8 hours (if job can accommodate that) or intermittent leave for flareups likely needed.  The MRI also noted a bone lesion and she is going to refer her to a specialist at  for that.

## 2020-09-03 ENCOUNTER — PATIENT MESSAGE (OUTPATIENT)
Dept: FAMILY MEDICINE CLINIC | Facility: CLINIC | Age: 55
End: 2020-09-03

## 2020-09-03 NOTE — TELEPHONE ENCOUNTER
Susan Chester MA 9/3/2020 1:28 PM EDT        ----- Message -----  From: Oseas Brasher  Sent: 9/3/2020 12:43 PM EDT  To: [facility]  Subject: Visit Follow-Up Question     Alissa Sánchez, hope you are well. My work did not like the wording in the last work note from 8/31/2020 so I am asking if you could please write a new note. You had the return work date for 9/4/2020 and then stated my FMLA would take over.    My new FMLA paperwork that you completed has not yet been approved so I am expected to return to work tomorrow, 9/4/2020. They go by a stated date only. If you could reword or send me a new note stating a future date ie. a week from now or until have a seen my referral doctors, that would be extremely helpful and greatly appreciated. Sorry this has been so difficult and I really appreciate your patience and help.    Call me at [phone] to discuss if needed. Thank you, Heather

## 2020-09-09 ENCOUNTER — PATIENT MESSAGE (OUTPATIENT)
Dept: FAMILY MEDICINE CLINIC | Facility: CLINIC | Age: 55
End: 2020-09-09

## 2020-09-10 NOTE — TELEPHONE ENCOUNTER
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 I will speak with my supervisor today for details. Could you please send a work note to be off until my Ortho Oncologist appointment on 9/21? See correspondence below. Call me at [phone] if needed.    \"I have reached out to the department to see if they can accommodate the restrictions listed on you previous application. They are unable to accommodate the restrictions listed on the FML application, and you will need to remain off work until the provider releases you with no restrictions.  I will need an updated medical note if the provider is not releasing you to return to work beyond 9/11/2020 as indicated on you last FML document.\"  FML  [facility]  "

## 2020-09-21 ENCOUNTER — TELEMEDICINE (OUTPATIENT)
Dept: FAMILY MEDICINE CLINIC | Facility: CLINIC | Age: 55
End: 2020-09-21

## 2020-09-21 DIAGNOSIS — M19.072 OSTEOARTHRITIS OF LEFT ANKLE OR FOOT: Primary | ICD-10-CM

## 2020-09-21 PROCEDURE — 99213 OFFICE O/P EST LOW 20 MIN: CPT | Performed by: FAMILY MEDICINE

## 2020-09-21 NOTE — PROGRESS NOTES
Mode of Visit: Video  Oseas Brasher confirmed that she was in [place] at the time of this video visit.   The visit included video face to face interaction. No technical issues occurred during this visit.   Patient has given verbal consent for this video visit.  She understands the limitations with limited options for physical exam and wishes to proceed with this visit.      Subjective       Chief Complaint   Patient presents with   • FMLA         HPI:        Oseas Brasher is a 55 y.o. female who presents to  [address] [facility] today to follow-up on her foot pain.  She has seen the bone/oncology specialist and thankfully the bone lesion was not cancerous.  She does need her FMLA papers updated.  So far she does not have an appointment with rheumatology.  Pain continues with intermittent episodes where it shoots up her leg.  We will continue current limitations for work in place.  So far her work has not been able to accommodate these restrictions and she has been off work.  She continues to take meloxicam for this discomfort. 15 minutes spent.     I have reviewed and updated her medications, medical history and problem list during today's office visit.     Social History     Tobacco Use   • Smoking status: Never Smoker   • Smokeless tobacco: Never Used   Substance Use Topics   • Alcohol use: Yes     Types: 4 Glasses of wine, 8 Cans of beer, 2 Shots of liquor per week     Comment: varies       Review of Systems   Musculoskeletal: Positive for arthralgias (left foot).         PE:   Objective   LMP 06/20/2017 (Exact Date)     There is no height or weight on file to calculate BMI.    Physical Exam   Constitutional: She appears well-developed. No distress.         Data Reviewed:   Mri Foot Left Without Contrast    Result Date: 8/29/2020  Impression: 1.  Moderate medial midfoot DJD. No evidence of acute bony trauma is seen.  2.  Patchy diffuse abnormal marrow signal  of the distal tibia of uncertain significance as discussed above.  D:  08/28/2020 E:  08/28/2020  This report was finalized on 8/29/2020 5:43 PM by Dr. Tushar Arango MD.                   A/P:     Assessment/Plan   Diagnoses and all orders for this visit:    1. Osteoarthritis of left ankle or foot (Primary)  Assessment & Plan:  Condition unchanged.  Await follow-up with rheumatology.  FMLA papers filled out during today's visit.  No change in work restrictions.          Follow up:    Return in about 1 month (around 10/21/2020), or if symptoms worsen or fail to improve, for recheck.

## 2020-09-21 NOTE — ASSESSMENT & PLAN NOTE
Condition unchanged.  Await follow-up with rheumatology.  FMLA papers filled out during today's visit.  No change in work restrictions.

## 2020-10-21 ENCOUNTER — OFFICE VISIT (OUTPATIENT)
Dept: FAMILY MEDICINE CLINIC | Facility: CLINIC | Age: 55
End: 2020-10-21

## 2020-10-21 VITALS
OXYGEN SATURATION: 98 % | WEIGHT: 193 LBS | BODY MASS INDEX: 35.51 KG/M2 | HEIGHT: 62 IN | TEMPERATURE: 97.5 F | SYSTOLIC BLOOD PRESSURE: 132 MMHG | HEART RATE: 58 BPM | RESPIRATION RATE: 16 BRPM | DIASTOLIC BLOOD PRESSURE: 75 MMHG

## 2020-10-21 DIAGNOSIS — M13.80 MIGRATORY POLYARTHRITIS: ICD-10-CM

## 2020-10-21 DIAGNOSIS — M79.672 LEFT FOOT PAIN: Primary | ICD-10-CM

## 2020-10-21 DIAGNOSIS — M19.072 OSTEOARTHRITIS OF LEFT ANKLE OR FOOT: ICD-10-CM

## 2020-10-21 PROCEDURE — 99214 OFFICE O/P EST MOD 30 MIN: CPT | Performed by: FAMILY MEDICINE

## 2020-10-21 NOTE — ASSESSMENT & PLAN NOTE
Left foot pain continues.  It has been migratory.  The left lateral foot is resolved today.  She now has pain on the left medial ankle area.  Initially letter given to continue same restrictions but that was created in error.  We then created second letter for her employer which will allow her to have a trial of unrestricted duty this week with follow-up in 1 week.  She is still awaiting her appointment with rheumatology.  Hopefully the new rheumatologist will be able to get her in sooner than January.  She will keep the January appointment with the additional rheumatologist intact for now.

## 2020-10-21 NOTE — PROGRESS NOTES
"   Subjective       Chief Complaint   Patient presents with   • Foot Pain     follow up    • Ankle Pain         SUBJECTIVE:       This patient presents for follow-up of left foot pain.  She has had intermittent flareups and most recently had a flareup in her right elbow which is still having some warmth.  Approximately 2 years ago she had similar situation in the right foot that did resolve after steroid injection.  She is also had some issues with the left upper extremity involving the thumb and the elbow in the past.  Her sister was recently diagnosed with rheumatoid arthritis.  She goes to rheumatologist call Dr. Limon and patient would like to see Dr. Limon as well.  She does have a rheumatology appointment in January.  She will keep that appointment until she finds out the wait time for Dr. Limon.  Patient is still on FMLA and this has run out as of October 17, 2020.  The only way she can return to work as if she has no restrictions which clearly is not the case today.  She will explore options for short or long-term disability.  History of Present Illness       Review of Systems   Musculoskeletal: Positive for arthralgias.        See hpi      I have reviewed the ROS as documented above. Hunter Sánchez MD              OBJECTIVE   Objective   /75   Pulse 58   Temp 97.5 °F (36.4 °C) (Skin)   Resp 16   Ht 157.5 cm (62.01\")   Wt 87.5 kg (193 lb)   LMP 06/20/2017 (Exact Date)   SpO2 98%   BMI 35.29 kg/m²  Body mass index is 35.29 kg/m².    Physical Exam  Musculoskeletal:      Comments: Left lateral foot nontender.  No obvious swelling.  Left medial ankle tenderness noted below medial malleolus.  No skin color changes.                         Procedures           ASSESSMENT/PLAN:     Assessment/Plan   Diagnoses and all orders for this visit:    1. Left foot pain (Primary)  Assessment & Plan:  Left foot pain continues.  It has been migratory.  The left lateral foot is resolved today.  She now has " pain on the left medial ankle area.  Initially letter given to continue same restrictions but that was created in error.  We then created second letter for her employer which will allow her to have a trial of unrestricted duty this week with follow-up in 1 week.  She is still awaiting her appointment with rheumatology.  Hopefully the new rheumatologist will be able to get her in sooner than January.  She will keep the January appointment with the additional rheumatologist intact for now.      2. Osteoarthritis of left ankle or foot  -     Ambulatory Referral to Rheumatology    3. Migratory polyarthritis  -     Ambulatory Referral to Rheumatology        FOLLOW UP:    Return for Recheck work status and left foot pain.

## 2020-10-27 ENCOUNTER — TRANSCRIBE ORDERS (OUTPATIENT)
Dept: ADMINISTRATIVE | Facility: HOSPITAL | Age: 55
End: 2020-10-27

## 2020-10-27 DIAGNOSIS — Z12.31 VISIT FOR SCREENING MAMMOGRAM: Primary | ICD-10-CM

## 2020-10-28 ENCOUNTER — OFFICE VISIT (OUTPATIENT)
Dept: FAMILY MEDICINE CLINIC | Facility: CLINIC | Age: 55
End: 2020-10-28

## 2020-10-28 VITALS
DIASTOLIC BLOOD PRESSURE: 81 MMHG | RESPIRATION RATE: 16 BRPM | TEMPERATURE: 96.9 F | BODY MASS INDEX: 35.15 KG/M2 | WEIGHT: 191 LBS | SYSTOLIC BLOOD PRESSURE: 125 MMHG | HEART RATE: 56 BPM | HEIGHT: 62 IN | OXYGEN SATURATION: 95 %

## 2020-10-28 DIAGNOSIS — M79.672 LEFT FOOT PAIN: Primary | ICD-10-CM

## 2020-10-28 DIAGNOSIS — M19.072 OSTEOARTHRITIS OF LEFT ANKLE OR FOOT: ICD-10-CM

## 2020-10-28 PROCEDURE — 99213 OFFICE O/P EST LOW 20 MIN: CPT | Performed by: FAMILY MEDICINE

## 2020-10-28 RX ORDER — IBUPROFEN 600 MG/1
600 TABLET ORAL EVERY 6 HOURS PRN
Qty: 120 TABLET | Refills: 0 | Status: SHIPPED | OUTPATIENT
Start: 2020-10-28 | End: 2022-02-04

## 2020-12-01 RX ORDER — PIOGLITAZONEHYDROCHLORIDE 15 MG/1
TABLET ORAL
Qty: 30 TABLET | OUTPATIENT
Start: 2020-12-01

## 2020-12-28 RX ORDER — PIOGLITAZONEHYDROCHLORIDE 15 MG/1
15 TABLET ORAL DAILY
Qty: 30 TABLET | Refills: 0 | Status: SHIPPED | OUTPATIENT
Start: 2020-12-28 | End: 2021-01-27 | Stop reason: ALTCHOICE

## 2020-12-28 NOTE — TELEPHONE ENCOUNTER
----- Message from Oseas Brasher sent at 12/28/2020 11:11 AM EST -----  Regarding: Prescription Question  Contact: [phone]  I don't know why this happened. Any idea? Thanks!  Your Declined Medications     The following medication renewals have been denied:   - pioglitazone (ACTOS) 15 MG tablet [Pharmacy Med Name: PIOGLITAZONE 15MG TABLETS]

## 2021-01-11 ENCOUNTER — PATIENT MESSAGE (OUTPATIENT)
Dept: FAMILY MEDICINE CLINIC | Facility: CLINIC | Age: 56
End: 2021-01-11

## 2021-01-11 NOTE — TELEPHONE ENCOUNTER
Yasmin Ceballos 1/11/2021 1:23 PM EST      ----- Message -----  From: Oseas Brasher  Sent: 1/11/2021 12:30 PM EST  To: [facility] Clinical Pool  Subject: Non-Urgent Medical Question     Hi Dr. Sánchez, I am scheduled to receive the first series of the covid-19 vaccine this Thursday, 14th. Do you think that will be ok for me to get?    Thanks!    Heather

## 2021-01-27 ENCOUNTER — OFFICE VISIT (OUTPATIENT)
Dept: FAMILY MEDICINE CLINIC | Facility: CLINIC | Age: 56
End: 2021-01-27

## 2021-01-27 VITALS
DIASTOLIC BLOOD PRESSURE: 75 MMHG | HEIGHT: 62 IN | WEIGHT: 190 LBS | BODY MASS INDEX: 34.96 KG/M2 | TEMPERATURE: 98.1 F | HEART RATE: 54 BPM | SYSTOLIC BLOOD PRESSURE: 117 MMHG

## 2021-01-27 DIAGNOSIS — K21.9 GASTROESOPHAGEAL REFLUX DISEASE WITHOUT ESOPHAGITIS: ICD-10-CM

## 2021-01-27 DIAGNOSIS — E78.2 MIXED HYPERLIPIDEMIA: ICD-10-CM

## 2021-01-27 DIAGNOSIS — F41.9 ANXIETY: ICD-10-CM

## 2021-01-27 DIAGNOSIS — E11.9 TYPE 2 DIABETES MELLITUS WITHOUT COMPLICATION, WITHOUT LONG-TERM CURRENT USE OF INSULIN (HCC): Primary | ICD-10-CM

## 2021-01-27 DIAGNOSIS — I10 ESSENTIAL HYPERTENSION: ICD-10-CM

## 2021-01-27 PROBLEM — R93.89 ABNORMAL MRI: Status: RESOLVED | Noted: 2020-08-31 | Resolved: 2021-01-27

## 2021-01-27 PROCEDURE — 99214 OFFICE O/P EST MOD 30 MIN: CPT | Performed by: FAMILY MEDICINE

## 2021-01-27 RX ORDER — SERTRALINE HYDROCHLORIDE 100 MG/1
100 TABLET, FILM COATED ORAL DAILY
Qty: 30 TABLET | Refills: 5 | Status: SHIPPED | OUTPATIENT
Start: 2021-01-27 | End: 2021-07-06

## 2021-01-27 RX ORDER — AMLODIPINE BESYLATE 5 MG/1
5 TABLET ORAL DAILY
Qty: 30 TABLET | Refills: 5 | Status: SHIPPED | OUTPATIENT
Start: 2021-01-27 | End: 2021-07-28 | Stop reason: SDUPTHER

## 2021-01-27 RX ORDER — CARVEDILOL 12.5 MG/1
12.5 TABLET ORAL 2 TIMES DAILY WITH MEALS
Qty: 60 TABLET | Refills: 5 | Status: SHIPPED | OUTPATIENT
Start: 2021-01-27 | End: 2021-07-28 | Stop reason: SDUPTHER

## 2021-01-27 RX ORDER — HYDROCHLOROTHIAZIDE 25 MG/1
25 TABLET ORAL DAILY
Qty: 30 TABLET | Refills: 5 | Status: SHIPPED | OUTPATIENT
Start: 2021-01-27 | End: 2021-07-28 | Stop reason: SDUPTHER

## 2021-01-27 RX ORDER — ATORVASTATIN CALCIUM 20 MG/1
20 TABLET, FILM COATED ORAL NIGHTLY
Qty: 30 TABLET | Refills: 5 | Status: SHIPPED | OUTPATIENT
Start: 2021-01-27 | End: 2021-07-28 | Stop reason: SDUPTHER

## 2021-01-27 RX ORDER — OMEPRAZOLE 40 MG/1
40 CAPSULE, DELAYED RELEASE ORAL NIGHTLY
Qty: 30 CAPSULE | Refills: 5 | Status: SHIPPED | OUTPATIENT
Start: 2021-01-27 | End: 2021-07-28 | Stop reason: SDUPTHER

## 2021-01-27 RX ORDER — DAPAGLIFLOZIN 10 MG/1
10 TABLET, FILM COATED ORAL
Qty: 30 TABLET | Refills: 5 | Status: SHIPPED | OUTPATIENT
Start: 2021-01-27 | End: 2021-07-28 | Stop reason: SDUPTHER

## 2021-01-27 NOTE — ASSESSMENT & PLAN NOTE
Diabetes is unchanged.   Medication changes per orders.  And discontinue pioglitazone due to pitting edema.  Changed to Farxiga.  Information shared in AVS.  Patient to check labs in 1 month.  Diabetes will be reassessed in 6 months.

## 2021-01-27 NOTE — PROGRESS NOTES
"Chief Complaint  Hyperlipidemia, Hypertension, Diabetes, and Anxiety    Subjective      History of Present Illness      Oseas Brasher presents to [facility] to review labs and refill medicines.  Blood pressure controlled.  GERD symptoms stable.  Lipids stable.  Type 2 diabetes controlled.  Patient having side effects of edema and possibly some abdomen issues with current pioglitazone therapy.    Patient has upcoming appointment with rheumatology.  Sed rate elevated.  Possible inflammatory arthritis condition being evaluated.    Review of Systems   Constitutional: Positive for fatigue.   Cardiovascular: Positive for leg swelling.   Gastrointestinal: Positive for nausea.   Musculoskeletal: Positive for arthralgias.             Objective     Vital Signs:   /75   Pulse 54   Temp 98.1 °F (36.7 °C)   Ht 157.5 cm (62.01\")   Wt 86.2 kg (190 lb)   BMI 34.74 kg/m²       Physical Exam  Vitals signs reviewed.   Constitutional:       General: She is not in acute distress.  Eyes:      General: Lids are normal.      Conjunctiva/sclera: Conjunctivae normal.   Neck:      Vascular: No carotid bruit.      Trachea: No tracheal deviation.   Cardiovascular:      Rate and Rhythm: Normal rate and regular rhythm.      Heart sounds: Normal heart sounds. No murmur.   Pulmonary:      Effort: Pulmonary effort is normal.      Breath sounds: Normal breath sounds.   Musculoskeletal:      Right lower le+ Pitting Edema present.      Left lower le+ Pitting Edema present.   Skin:     General: Skin is warm and dry.   Neurological:      Mental Status: She is alert. She is not disoriented.   Psychiatric:         Speech: Speech normal.         Behavior: Behavior normal. Behavior is cooperative.              Result Review :                        Assessment/Plan     Assessment and Plan      Diagnoses and all orders for this visit:    1. Type 2 diabetes mellitus without complication, without long-term " current use of insulin (CMS/[facility]) (Primary)  Assessment & Plan:  Diabetes is unchanged.   Medication changes per orders.  And discontinue pioglitazone due to pitting edema.  Changed to Farxiga.  Information shared in AVS.  Patient to check labs in 1 month.  Diabetes will be reassessed in 6 months.    Orders:  -     Farxiga 10 MG tablet; Take 10 mg by mouth Every Morning Before Breakfast for 180 days.  Dispense: 30 tablet; Refill: 5  -     Hemoglobin A1c; Future  -     MicroAlbumin, Urine, Random - Urine, Clean Catch; Future  -     Hemoglobin A1c; Future  -     MicroAlbumin, Urine, Random - Urine, Clean Catch; Future    2. Essential hypertension  Assessment & Plan:  Hypertension is unchanged.  Continue current treatment regimen.  Blood pressure will be reassessed at the next regular appointment.    Orders:  -     amLODIPine (NORVASC) 5 MG tablet; Take 1 tablet by mouth Daily for 180 days.  Dispense: 30 tablet; Refill: 5  -     carvedilol (COREG) 12.5 MG tablet; Take 1 tablet by mouth 2 (Two) Times a Day With Meals for 180 days.  Dispense: 60 tablet; Refill: 5  -     hydroCHLOROthiazide (HYDRODIURIL) 25 MG tablet; Take 1 tablet by mouth Daily for 180 days.  Dispense: 30 tablet; Refill: 5  -     Comprehensive Metabolic Panel; Future  -     CBC & Differential; Future  -     TSH; Future  -     Comprehensive Metabolic Panel; Future  -     CBC & Differential; Future  -     TSH; Future    3. Mixed hyperlipidemia  Assessment & Plan:  Lipid abnormalities are unchanged.  Pharmacotherapy as ordered.  Lipids will be reassessed in 6 months.    Orders:  -     atorvastatin (LIPITOR) 20 MG tablet; Take 1 tablet by mouth Every Night for 180 days.  Dispense: 30 tablet; Refill: 5  -     Lipid Panel With / Chol / HDL Ratio; Future  -     CK; Future  -     Lipid Panel; Future  -     CK; Future    4. Gastroesophageal reflux disease without esophagitis  Assessment & Plan:  The current medical regimen is effective;  continue present plan and  medications.        Orders:  -     omeprazole (priLOSEC) 40 MG capsule; Take 1 capsule by mouth Every Night for 180 days.  Dispense: 30 capsule; Refill: 5    5. Anxiety  Assessment & Plan:  The current medical regimen is effective;  continue present plan and medications.      Orders:  -     sertraline (ZOLOFT) 100 MG tablet; Take 1 tablet by mouth Daily for 180 days.  Dispense: 30 tablet; Refill: 5          Follow Up   Return in about 6 months (around 7/27/2021) for Recheck/Medication.    Patient was given instructions and counseling regarding her condition or for health maintenance advice. Please see specific information pulled into the AVS if appropriate.

## 2021-01-28 ENCOUNTER — HOSPITAL ENCOUNTER (OUTPATIENT)
Dept: MAMMOGRAPHY | Facility: HOSPITAL | Age: 56
Discharge: HOME | End: 2021-01-28
Admitting: FAMILY MEDICINE

## 2021-01-28 DIAGNOSIS — Z12.31 VISIT FOR SCREENING MAMMOGRAM: ICD-10-CM

## 2021-01-28 PROCEDURE — 77067 SCR MAMMO BI INCL CAD: CPT

## 2021-01-28 PROCEDURE — 77063 BREAST TOMOSYNTHESIS BI: CPT

## 2021-01-28 PROCEDURE — 77063 BREAST TOMOSYNTHESIS BI: CPT | Performed by: RADIOLOGY

## 2021-01-28 PROCEDURE — 77067 SCR MAMMO BI INCL CAD: CPT | Performed by: RADIOLOGY

## 2021-02-03 ENCOUNTER — TELEPHONE (OUTPATIENT)
Dept: FAMILY MEDICINE CLINIC | Facility: CLINIC | Age: 56
End: 2021-02-03

## 2021-02-03 NOTE — TELEPHONE ENCOUNTER
MARY FROM [facility] IS REQUESTING A CALL BACK ABOUT PRIOR AUTHORIZATION REQUESTS SENT OVER ON BEHALF OF THE PATIENT.    PLEASE ADVISE.    BEST CALL BACK NUMBER: [phone] REFERENCE LOZADA: [id number]

## 2021-07-05 DIAGNOSIS — F41.9 ANXIETY: ICD-10-CM

## 2021-07-06 RX ORDER — SERTRALINE HYDROCHLORIDE 100 MG/1
TABLET, FILM COATED ORAL
Qty: 15 TABLET | Refills: 0 | Status: SHIPPED | OUTPATIENT
Start: 2021-07-06 | End: 2021-07-28 | Stop reason: ALTCHOICE

## 2021-07-06 NOTE — TELEPHONE ENCOUNTER
Pharmacy Name: [id number] - [place], [place] - [address] [phone]  - [phone]      Pharmacy representative name: FAYE    Pharmacy representative phone number: [phone]    What medication are you calling in regards to: sertraline (ZOLOFT) 100 MG tablet    What question does the pharmacy have: PHARMACY CALLED TO CHECK ON THE STATUS OF THIS REFILL.  THE PATIENT IS OUT OF THE MEDICATION.      Who is the provider that prescribed the medication: DR BELLE FIERRO

## 2021-07-27 ENCOUNTER — LAB (OUTPATIENT)
Dept: LAB | Facility: HOSPITAL | Age: 56
End: 2021-07-27

## 2021-07-27 DIAGNOSIS — E78.2 MIXED HYPERLIPIDEMIA: ICD-10-CM

## 2021-07-27 DIAGNOSIS — E11.69 TYPE 2 DIABETES MELLITUS WITH OTHER SPECIFIED COMPLICATION, UNSPECIFIED WHETHER LONG TERM INSULIN USE (HCC): ICD-10-CM

## 2021-07-27 DIAGNOSIS — I10 ESSENTIAL HYPERTENSION, MALIGNANT: Primary | ICD-10-CM

## 2021-07-27 LAB
ALBUMIN SERPL-MCNC: 4.3 G/DL (ref 3.5–5.2)
ALBUMIN UR-MCNC: <1.2 MG/DL
ALBUMIN/GLOB SERPL: 1.7 G/DL
ALP SERPL-CCNC: 81 U/L (ref 39–117)
ALT SERPL W P-5'-P-CCNC: 18 U/L (ref 1–33)
ANION GAP SERPL CALCULATED.3IONS-SCNC: 13 MMOL/L (ref 5–15)
AST SERPL-CCNC: 17 U/L (ref 1–32)
BASOPHILS # BLD AUTO: 0.05 10*3/MM3 (ref 0–0.2)
BASOPHILS NFR BLD AUTO: 0.7 % (ref 0–1.5)
BILIRUB SERPL-MCNC: 0.4 MG/DL (ref 0–1.2)
BUN SERPL-MCNC: 12 MG/DL (ref 6–20)
BUN/CREAT SERPL: 21.1 (ref 7–25)
CALCIUM SPEC-SCNC: 9.5 MG/DL (ref 8.6–10.5)
CHLORIDE SERPL-SCNC: 99 MMOL/L (ref 98–107)
CHOLEST SERPL-MCNC: 128 MG/DL (ref 0–200)
CK SERPL-CCNC: 53 U/L (ref 20–180)
CO2 SERPL-SCNC: 25 MMOL/L (ref 22–29)
CREAT SERPL-MCNC: 0.57 MG/DL (ref 0.57–1)
DEPRECATED RDW RBC AUTO: 49 FL (ref 37–54)
EOSINOPHIL # BLD AUTO: 0.14 10*3/MM3 (ref 0–0.4)
EOSINOPHIL NFR BLD AUTO: 2 % (ref 0.3–6.2)
ERYTHROCYTE [DISTWIDTH] IN BLOOD BY AUTOMATED COUNT: 15.2 % (ref 12.3–15.4)
GFR SERPL CREATININE-BSD FRML MDRD: 109 ML/MIN/1.73
GLOBULIN UR ELPH-MCNC: 2.5 GM/DL
GLUCOSE SERPL-MCNC: 141 MG/DL (ref 65–99)
HBA1C MFR BLD: 5.98 % (ref 4.8–5.6)
HCT VFR BLD AUTO: 39.8 % (ref 34–46.6)
HDLC SERPL QL: 2.61
HDLC SERPL-MCNC: 49 MG/DL (ref 40–60)
HGB BLD-MCNC: 12.7 G/DL (ref 12–15.9)
IMM GRANULOCYTES # BLD AUTO: 0.03 10*3/MM3 (ref 0–0.05)
IMM GRANULOCYTES NFR BLD AUTO: 0.4 % (ref 0–0.5)
LDLC SERPL CALC-MCNC: 47 MG/DL (ref 0–100)
LYMPHOCYTES # BLD AUTO: 1.44 10*3/MM3 (ref 0.7–3.1)
LYMPHOCYTES NFR BLD AUTO: 20.7 % (ref 19.6–45.3)
MCH RBC QN AUTO: 28.1 PG (ref 26.6–33)
MCHC RBC AUTO-ENTMCNC: 31.9 G/DL (ref 31.5–35.7)
MCV RBC AUTO: 88.1 FL (ref 79–97)
MONOCYTES # BLD AUTO: 0.51 10*3/MM3 (ref 0.1–0.9)
MONOCYTES NFR BLD AUTO: 7.3 % (ref 5–12)
NEUTROPHILS NFR BLD AUTO: 4.78 10*3/MM3 (ref 1.7–7)
NEUTROPHILS NFR BLD AUTO: 68.9 % (ref 42.7–76)
NRBC BLD AUTO-RTO: 0 /100 WBC (ref 0–0.2)
PLATELET # BLD AUTO: 301 10*3/MM3 (ref 140–450)
PMV BLD AUTO: 11.3 FL (ref 6–12)
POTASSIUM SERPL-SCNC: 3.3 MMOL/L (ref 3.5–5.2)
PROT SERPL-MCNC: 6.8 G/DL (ref 6–8.5)
RBC # BLD AUTO: 4.52 10*6/MM3 (ref 3.77–5.28)
SODIUM SERPL-SCNC: 137 MMOL/L (ref 136–145)
TRIGL SERPL-MCNC: 196 MG/DL (ref 0–150)
VLDLC SERPL-MCNC: 32 MG/DL (ref 5–40)
WBC # BLD AUTO: 6.95 10*3/MM3 (ref 3.4–10.8)

## 2021-07-27 PROCEDURE — 82043 UR ALBUMIN QUANTITATIVE: CPT

## 2021-07-27 PROCEDURE — 82550 ASSAY OF CK (CPK): CPT

## 2021-07-27 PROCEDURE — 80053 COMPREHEN METABOLIC PANEL: CPT

## 2021-07-27 PROCEDURE — 85025 COMPLETE CBC W/AUTO DIFF WBC: CPT

## 2021-07-27 PROCEDURE — 84443 ASSAY THYROID STIM HORMONE: CPT

## 2021-07-27 PROCEDURE — 83036 HEMOGLOBIN GLYCOSYLATED A1C: CPT

## 2021-07-27 PROCEDURE — 80061 LIPID PANEL: CPT

## 2021-07-27 PROCEDURE — 36415 COLL VENOUS BLD VENIPUNCTURE: CPT

## 2021-07-28 ENCOUNTER — OFFICE VISIT (OUTPATIENT)
Dept: FAMILY MEDICINE CLINIC | Facility: CLINIC | Age: 56
End: 2021-07-28

## 2021-07-28 VITALS
RESPIRATION RATE: 16 BRPM | DIASTOLIC BLOOD PRESSURE: 79 MMHG | SYSTOLIC BLOOD PRESSURE: 121 MMHG | HEART RATE: 54 BPM | OXYGEN SATURATION: 97 % | TEMPERATURE: 97.6 F | WEIGHT: 172 LBS | HEIGHT: 62 IN | BODY MASS INDEX: 31.65 KG/M2

## 2021-07-28 DIAGNOSIS — R53.83 FATIGUE, UNSPECIFIED TYPE: ICD-10-CM

## 2021-07-28 DIAGNOSIS — Z23 ENCOUNTER FOR VACCINATION: ICD-10-CM

## 2021-07-28 DIAGNOSIS — F41.9 ANXIETY: ICD-10-CM

## 2021-07-28 DIAGNOSIS — M19.072 OSTEOARTHRITIS OF LEFT ANKLE OR FOOT: ICD-10-CM

## 2021-07-28 DIAGNOSIS — I10 ESSENTIAL HYPERTENSION: Primary | ICD-10-CM

## 2021-07-28 DIAGNOSIS — R79.89 ELEVATED TSH: ICD-10-CM

## 2021-07-28 DIAGNOSIS — E78.2 MIXED HYPERLIPIDEMIA: ICD-10-CM

## 2021-07-28 DIAGNOSIS — M79.672 LEFT FOOT PAIN: ICD-10-CM

## 2021-07-28 DIAGNOSIS — M06.09 SERONEGATIVE RHEUMATOID ARTHRITIS OF MULTIPLE SITES (HCC): ICD-10-CM

## 2021-07-28 DIAGNOSIS — R89.9 ABNORMAL LABORATORY TEST RESULT: ICD-10-CM

## 2021-07-28 DIAGNOSIS — K21.9 GASTROESOPHAGEAL REFLUX DISEASE WITHOUT ESOPHAGITIS: ICD-10-CM

## 2021-07-28 DIAGNOSIS — E11.9 TYPE 2 DIABETES MELLITUS WITHOUT COMPLICATION, WITHOUT LONG-TERM CURRENT USE OF INSULIN (HCC): ICD-10-CM

## 2021-07-28 DIAGNOSIS — E87.6 HYPOKALEMIA: ICD-10-CM

## 2021-07-28 LAB — TSH SERPL DL<=0.05 MIU/L-ACNC: 4.54 UIU/ML (ref 0.27–4.2)

## 2021-07-28 PROCEDURE — 90471 IMMUNIZATION ADMIN: CPT | Performed by: FAMILY MEDICINE

## 2021-07-28 PROCEDURE — 90750 HZV VACC RECOMBINANT IM: CPT | Performed by: FAMILY MEDICINE

## 2021-07-28 PROCEDURE — 99214 OFFICE O/P EST MOD 30 MIN: CPT | Performed by: FAMILY MEDICINE

## 2021-07-28 RX ORDER — SERTRALINE HYDROCHLORIDE 100 MG/1
100 TABLET, FILM COATED ORAL DAILY
Qty: 30 TABLET | Refills: 5 | Status: CANCELLED | OUTPATIENT
Start: 2021-07-28

## 2021-07-28 RX ORDER — POTASSIUM CHLORIDE 750 MG/1
10 TABLET, EXTENDED RELEASE ORAL DAILY
Qty: 30 TABLET | Refills: 5 | Status: SHIPPED | OUTPATIENT
Start: 2021-07-28 | End: 2022-02-04 | Stop reason: SDUPTHER

## 2021-07-28 RX ORDER — HYDROCHLOROTHIAZIDE 25 MG/1
25 TABLET ORAL DAILY
Qty: 30 TABLET | Refills: 5 | Status: SHIPPED | OUTPATIENT
Start: 2021-07-28 | End: 2022-02-04 | Stop reason: SDUPTHER

## 2021-07-28 RX ORDER — HYDROXYCHLOROQUINE SULFATE 200 MG/1
1 TABLET, FILM COATED ORAL
COMMUNITY
Start: 2021-02-02 | End: 2021-12-07

## 2021-07-28 RX ORDER — DULOXETIN HYDROCHLORIDE 60 MG/1
60 CAPSULE, DELAYED RELEASE ORAL DAILY
COMMUNITY
End: 2021-09-01 | Stop reason: SDUPTHER

## 2021-07-28 RX ORDER — DAPAGLIFLOZIN 10 MG/1
10 TABLET, FILM COATED ORAL
Qty: 30 TABLET | Refills: 5 | Status: SHIPPED | OUTPATIENT
Start: 2021-07-28 | End: 2022-02-04 | Stop reason: SDUPTHER

## 2021-07-28 RX ORDER — OMEPRAZOLE 40 MG/1
40 CAPSULE, DELAYED RELEASE ORAL NIGHTLY
Qty: 30 CAPSULE | Refills: 5 | Status: SHIPPED | OUTPATIENT
Start: 2021-07-28 | End: 2022-01-24

## 2021-07-28 RX ORDER — IBUPROFEN 600 MG/1
600 TABLET ORAL EVERY 6 HOURS PRN
Qty: 120 TABLET | Refills: 0 | Status: CANCELLED | OUTPATIENT
Start: 2021-07-28 | End: 2021-10-26

## 2021-07-28 RX ORDER — CARVEDILOL 12.5 MG/1
12.5 TABLET ORAL 2 TIMES DAILY WITH MEALS
Qty: 60 TABLET | Refills: 5 | Status: SHIPPED | OUTPATIENT
Start: 2021-07-28 | End: 2022-02-04 | Stop reason: SDUPTHER

## 2021-07-28 RX ORDER — AMLODIPINE BESYLATE 5 MG/1
5 TABLET ORAL DAILY
Qty: 30 TABLET | Refills: 5 | Status: SHIPPED | OUTPATIENT
Start: 2021-07-28 | End: 2022-02-04 | Stop reason: SDUPTHER

## 2021-07-28 RX ORDER — ATORVASTATIN CALCIUM 20 MG/1
20 TABLET, FILM COATED ORAL NIGHTLY
Qty: 30 TABLET | Refills: 5 | Status: SHIPPED | OUTPATIENT
Start: 2021-07-28 | End: 2022-02-04 | Stop reason: SDUPTHER

## 2021-07-28 NOTE — PROGRESS NOTES
"Chief Complaint  Hypertension, Hyperlipidemia, Depression, and Heartburn    Subjective          Heather presents to [facility] to refill medications and review recent lab results. No medication side effects are reported. Overall the patient is feeling well.  She was recently told to switch to duloxetine to help with current anxiety along with her foot pain and leg pain due to rheumatoid issues.  Recent labs did show decreased potassium and mild elevation of TSH.  She has been having excessive fatigue.          Objective   Vital Signs:   Vitals:    07/28/21 1047   BP: 121/79   Pulse: 54   Resp: 16   Temp: 97.6 °F (36.4 °C)   SpO2: 97%   Weight: 78 kg (172 lb)   Height: 157.5 cm (62.01\")        Physical Exam  Vitals reviewed.   Constitutional:       General: She is not in acute distress.  Eyes:      General: Lids are normal.      Conjunctiva/sclera: Conjunctivae normal.   Neck:      Vascular: No carotid bruit.      Trachea: No tracheal deviation.   Cardiovascular:      Rate and Rhythm: Normal rate and regular rhythm.      Heart sounds: Normal heart sounds. No murmur heard.     Pulmonary:      Effort: Pulmonary effort is normal.      Breath sounds: Normal breath sounds.   Skin:     General: Skin is warm and dry.   Neurological:      Mental Status: She is alert. She is not disoriented.   Psychiatric:         Speech: Speech normal.         Behavior: Behavior normal. Behavior is cooperative.          Result Review :     The following data was reviewed by: Hunter Sánchez MD on 07/28/2021:  Lipid Panel With / Chol / HDL Ratio (07/27/2021 10:19)  CBC & Differential (07/27/2021 10:19)  MicroAlbumin, Urine, Random - Urine, Clean Catch (07/27/2021 10:19)  Comprehensive Metabolic Panel (07/27/2021 10:19)  CK (07/27/2021 10:19)  Hemoglobin A1c (07/27/2021 10:19)  TSH (07/27/2021 10:09)  Comprehensive metabolic panel (06/18/2021 11:33)  CBC and differential (06/18/2021 11:33)           Assessment and " Plan    Diagnoses and all orders for this visit:    1. Essential hypertension (Primary)  Assessment & Plan:  Hypertension is unchanged.  Continue current treatment regimen.  Blood pressure will be reassessed at the next regular appointment.    Orders:  -     hydroCHLOROthiazide (HYDRODIURIL) 25 MG tablet; Take 1 tablet by mouth Daily for 180 days.  Dispense: 30 tablet; Refill: 5  -     carvedilol (COREG) 12.5 MG tablet; Take 1 tablet by mouth 2 (Two) Times a Day With Meals for 180 days.  Dispense: 60 tablet; Refill: 5  -     amLODIPine (NORVASC) 5 MG tablet; Take 1 tablet by mouth Daily for 180 days.  Dispense: 30 tablet; Refill: 5    2. Anxiety  Assessment & Plan:  Stable. Change medication to duloxetine. Start with 60 mg tablet.       3. Gastroesophageal reflux disease without esophagitis  Assessment & Plan:  The current medical regimen is effective;  continue present plan and medications.      Orders:  -     omeprazole (priLOSEC) 40 MG capsule; Take 1 capsule by mouth Every Night for 180 days.  Dispense: 30 capsule; Refill: 5    4. Left foot pain  Assessment & Plan:  Change to duloxetine      5. Osteoarthritis of left ankle or foot    6. Type 2 diabetes mellitus without complication, without long-term current use of insulin (CMS/[facility])  Assessment & Plan:  Diabetes is unchanged.   Continue current treatment regimen.  Diabetes will be reassessed in 1 month.    Orders:  -     Farxiga 10 MG tablet; Take 10 mg by mouth Every Morning Before Breakfast for 180 days.  Dispense: 30 tablet; Refill: 5    7. Mixed hyperlipidemia  Assessment & Plan:  Lipid abnormalities are unchanged.  Pharmacotherapy as ordered.  Lipids will be reassessed in 3 months.    Orders:  -     atorvastatin (LIPITOR) 20 MG tablet; Take 1 tablet by mouth Every Night for 180 days.  Dispense: 30 tablet; Refill: 5    8. Fatigue, unspecified type  -     TSH+Free T4; Future  -     Thyroid Antibodies; Future    9. Abnormal laboratory test result  -      TSH+Free T4; Future  -     Thyroid Antibodies; Future    10. Elevated TSH  -     TSH+Free T4; Future  -     Thyroid Antibodies; Future    11. Seronegative rheumatoid arthritis of multiple sites (CMS/HCC)    12. Hypokalemia  -     potassium chloride (K-DUR,KLOR-CON) 10 MEQ CR tablet; Take 1 tablet by mouth Daily for 180 days.  Dispense: 30 tablet; Refill: 5  -     Electrolyte Panel; Future    Other orders  -     Cancel: sertraline (ZOLOFT) 100 MG tablet; Take 1 tablet by mouth Daily.  Dispense: 30 tablet; Refill: 5  -     Cancel: ibuprofen (ADVIL,MOTRIN) 600 MG tablet; Take 1 tablet by mouth Every 6 (Six) Hours As Needed for Moderate Pain  for up to 90 days.  Dispense: 120 tablet; Refill: 0      Follow Up   Return in about 1 month (around 8/28/2021) for recheck/refill medication.  Patient was given instructions and counseling regarding her condition or for health maintenance advice. Please see specific information pulled into the AVS if appropriate.

## 2021-07-28 NOTE — PROGRESS NOTES
Injection  Injection performed in Right deltoid by Christiane Munson. Patient tolerated the procedure well without complications.  07/28/21   Christiane Munson

## 2021-08-27 ENCOUNTER — LAB (OUTPATIENT)
Dept: LAB | Facility: HOSPITAL | Age: 56
End: 2021-08-27

## 2021-08-27 PROCEDURE — 84443 ASSAY THYROID STIM HORMONE: CPT | Performed by: FAMILY MEDICINE

## 2021-08-27 PROCEDURE — 86376 MICROSOMAL ANTIBODY EACH: CPT | Performed by: FAMILY MEDICINE

## 2021-08-27 PROCEDURE — 80051 ELECTROLYTE PANEL: CPT | Performed by: FAMILY MEDICINE

## 2021-08-27 PROCEDURE — 84439 ASSAY OF FREE THYROXINE: CPT | Performed by: FAMILY MEDICINE

## 2021-08-27 PROCEDURE — 86800 THYROGLOBULIN ANTIBODY: CPT | Performed by: FAMILY MEDICINE

## 2021-09-01 ENCOUNTER — TELEMEDICINE (OUTPATIENT)
Dept: FAMILY MEDICINE CLINIC | Facility: CLINIC | Age: 56
End: 2021-09-01

## 2021-09-01 VITALS — BODY MASS INDEX: 31.65 KG/M2 | WEIGHT: 172 LBS | HEIGHT: 62 IN

## 2021-09-01 DIAGNOSIS — E78.2 MIXED HYPERLIPIDEMIA: ICD-10-CM

## 2021-09-01 DIAGNOSIS — M89.8X2 PAIN OF RIGHT HUMERUS: ICD-10-CM

## 2021-09-01 DIAGNOSIS — M79.672 LEFT FOOT PAIN: ICD-10-CM

## 2021-09-01 DIAGNOSIS — E11.9 TYPE 2 DIABETES MELLITUS WITHOUT COMPLICATION, WITHOUT LONG-TERM CURRENT USE OF INSULIN (HCC): ICD-10-CM

## 2021-09-01 DIAGNOSIS — F41.9 ANXIETY: Primary | ICD-10-CM

## 2021-09-01 DIAGNOSIS — I10 ESSENTIAL HYPERTENSION: ICD-10-CM

## 2021-09-01 PROCEDURE — 99214 OFFICE O/P EST MOD 30 MIN: CPT | Performed by: FAMILY MEDICINE

## 2021-09-01 RX ORDER — MAGNESIUM 200 MG
TABLET ORAL
COMMUNITY

## 2021-09-01 RX ORDER — CHOLECALCIFEROL (VITAMIN D3) 1250 MCG
CAPSULE ORAL
COMMUNITY

## 2021-09-01 RX ORDER — DULOXETIN HYDROCHLORIDE 60 MG/1
60 CAPSULE, DELAYED RELEASE ORAL DAILY
Qty: 30 CAPSULE | Refills: 4 | Status: SHIPPED | OUTPATIENT
Start: 2021-09-01 | End: 2022-02-04 | Stop reason: SDUPTHER

## 2021-09-01 NOTE — PROGRESS NOTES
"Mode of Visit: Video  You have chosen to receive care through a telehealth visit.  Do you consent to use a video/audio connection for your medical care today? Yes   The visit included audio and video interaction. No technical issues occurred during this visit.    Subjective       Chief Complaint   Patient presents with   • Hyperlipidemia     1 month follow up          HPI:        Heather is a 55 y.o. female who presents to  [address] today to follow-up on recent medication changes.  Duloxetine has not helped the fatigue much but does seem to help with the foot pain.  She has been having some right deltoid pain for the past 6 months.  No injury.  No association with immunizations or vaccines.  Pain is dull in nature.    Recent labs have shown correction of electrolytes.  Potassium is now normal.  Abnormal TSH is now within normal limits.              PE:   Objective   Vitals:    09/01/21 1302   Weight: 78 kg (172 lb)   Height: 157.5 cm (62.01\")        Body mass index is 31.45 kg/m².    Physical Exam   Constitutional: She appears well-developed. No distress.   Eyes: Conjunctivae are normal. No scleral icterus.   Neck: Neck normal appearance.  Pulmonary/Chest: Effort normal.  She no audible wheeze...  Musculoskeletal:        Arms:    Neurological: She is alert.   Skin: Skin is dry.   Psychiatric: She has a normal mood and affect.             The following data was reviewed by: Hunter Sánchez MD on 09/01/2021:  Electrolyte Panel (08/27/2021 11:22)  Thyroid Antibodies (08/27/2021 11:22)  TSH+Free T4 (08/27/2021 11:22)                A/P:     Assessment/Plan   Diagnoses and all orders for this visit:    1. Anxiety (Primary)  Assessment & Plan:  No interval change in anxiety.  Fatigue continues.  No side effects reported with duloxetine.    Orders:  -     DULoxetine (CYMBALTA) 60 MG capsule; Take 1 capsule by mouth Daily for 150 days.  Dispense: 30 capsule; " Refill: 4    2. Left foot pain  Assessment & Plan:  Left foot pain has improved with addition of duloxetine.  Continue same therapy.    Orders:  -     DULoxetine (CYMBALTA) 60 MG capsule; Take 1 capsule by mouth Daily for 150 days.  Dispense: 30 capsule; Refill: 4    3. Pain of right humerus  Assessment & Plan:  New pain over the last 6 months.  Plan getting humerus x-ray on September 17.  Order placed.  Patient's schedule did not allow getting x-ray sooner.    Orders:  -     XR Humerus Right (In Office); Future    4. Essential hypertension  -     Comprehensive Metabolic Panel; Future  -     CBC & Differential; Future  -     TSH; Future    5. Mixed hyperlipidemia  -     Lipid Panel With / Chol / HDL Ratio; Future  -     CK; Future    6. Type 2 diabetes mellitus without complication, without long-term current use of insulin (CMS/[facility])  -     Comprehensive Metabolic Panel; Future  -     Hemoglobin A1c; Future  -     MicroAlbumin, Urine, Random - Urine, Clean Catch; Future            Follow up:   Return in about 5 months (around 1/20/2022) for recheck/refill medication.

## 2021-09-01 NOTE — ASSESSMENT & PLAN NOTE
New pain over the last 6 months.  Plan getting humerus x-ray on September 17.  Order placed.  Patient's schedule did not allow getting x-ray sooner.

## 2021-12-06 ENCOUNTER — TELEPHONE (OUTPATIENT)
Dept: FAMILY MEDICINE CLINIC | Facility: CLINIC | Age: 56
End: 2021-12-06

## 2021-12-06 NOTE — TELEPHONE ENCOUNTER
PATIENT IS CALLING IN REGARDS TO SPEAK WITH DR FIERRO ABOUT HER POST MENOPAUSAL BLEEDING, SHE IS REQUESTING A REFERRAL TO SEE A GYNECOLOGIST. PLEASE ADVISE THANK YOU!

## 2021-12-07 ENCOUNTER — TELEMEDICINE (OUTPATIENT)
Dept: FAMILY MEDICINE CLINIC | Facility: CLINIC | Age: 56
End: 2021-12-07

## 2021-12-07 VITALS
DIASTOLIC BLOOD PRESSURE: 87 MMHG | HEART RATE: 76 BPM | BODY MASS INDEX: 34.04 KG/M2 | OXYGEN SATURATION: 97 % | SYSTOLIC BLOOD PRESSURE: 136 MMHG | WEIGHT: 185 LBS | RESPIRATION RATE: 16 BRPM | TEMPERATURE: 97.3 F | HEIGHT: 62 IN

## 2021-12-07 DIAGNOSIS — N95.0 POSTMENOPAUSAL BLEEDING: ICD-10-CM

## 2021-12-07 DIAGNOSIS — Z00.00 ANNUAL PHYSICAL EXAM: Primary | ICD-10-CM

## 2021-12-07 PROCEDURE — 99213 OFFICE O/P EST LOW 20 MIN: CPT

## 2021-12-07 PROCEDURE — 99396 PREV VISIT EST AGE 40-64: CPT

## 2021-12-07 RX ORDER — HYDROXYCHLOROQUINE SULFATE 200 MG/1
200 TABLET, FILM COATED ORAL
COMMUNITY
Start: 2021-11-19

## 2021-12-07 NOTE — PROGRESS NOTES
Chief Complaint  Annual Exam and Gynecologic Exam    Subjective          Oseas Brasher presents to [facility]  History of Present Illness    Oseas Brasher 56 y.o. female is  0, Para 0 who presents for annual exam. The patient has no complaints today. The patient is not currently sexually active. GYN screening history: last pap: approximate date 2017 and was normal. The patient has never been taking hormone replacement therapy. Patient reports post-menopausal vaginal bleeding.  Vaginal bleeding has been going on for 1 week.  Patient feels bleeding is coming from vagina.  Bleeding is not associated with pain.  The patient reports cyclic symptoms including: bloating and breast tenderness., abdominal cramping, and lower back pain. The patient participates in regular exercise: no.  She denies additional complaints.  History of abnormal Pap smear: yes - two abnormal pap smear results and Cryotherapy twice  Family history of uterine or ovarian cancer: yes - first cousin  Family history of colon caner:  no  History of abnormal mammogram: yes - one abnormal mammogram, had right breast biopsy-benign  Family history of breast cancer: no  Colonoscopy history:   3/1/2017, polyp removal  DEXA scan:  The patient believes this was completed in     She declines STD testing.        She has a problem list of the following:   Patient Active Problem List   Diagnosis   • Anemia   • Anxiety   • Gastroesophageal reflux disease without esophagitis   • Mixed hyperlipidemia   • Essential hypertension   • Type 2 diabetes mellitus without complication, without long-term current use of insulin (HCC)   • Cardiac murmur   • Sleep disturbance   • Class 1 obesity due to excess calories with serious comorbidity and body mass index (BMI) of 34.0 to 34.9 in adult   • Left foot pain   • Osteoarthritis of left ankle and left foot   • Migraine aura without headache (migraine equivalents)   • Seronegative  rheumatoid arthritis of multiple sites (HCC)   • Pain of right humerus         She has been compliant with the following medications:   Current Outpatient Medications:   •  ALPRAZolam (XANAX) 0.5 MG tablet, Take 0.5 mg by mouth., Disp: , Rfl:   •  amLODIPine (NORVASC) 5 MG tablet, Take 1 tablet by mouth Daily for 180 days., Disp: 30 tablet, Rfl: 5  •  aspirin 81 MG tablet, Take 81 mg by mouth daily., Disp: , Rfl:   •  atorvastatin (LIPITOR) 20 MG tablet, Take 1 tablet by mouth Every Night for 180 days., Disp: 30 tablet, Rfl: 5  •  carvedilol (COREG) 12.5 MG tablet, Take 1 tablet by mouth 2 (Two) Times a Day With Meals for 180 days., Disp: 60 tablet, Rfl: 5  •  Cholecalciferol (Vitamin D3) 1.25 MG (96868 UT) capsule, , Disp: , Rfl:   •  Cyanocobalamin (Vitamin B-12) 1000 MCG sublingual tablet, , Disp: , Rfl:   •  DULoxetine (CYMBALTA) 60 MG capsule, Take 1 capsule by mouth Daily for 150 days., Disp: 30 capsule, Rfl: 4  •  Farxiga 10 MG tablet, Take 10 mg by mouth Every Morning Before Breakfast for 180 days., Disp: 30 tablet, Rfl: 5  •  hydroCHLOROthiazide (HYDRODIURIL) 25 MG tablet, Take 1 tablet by mouth Daily for 180 days., Disp: 30 tablet, Rfl: 5  •  hydroxychloroquine (PLAQUENIL) 200 MG tablet, Take 200 mg by mouth., Disp: , Rfl:   •  Omega-3 Fatty Acids (FISH OIL PO), Take  by mouth Daily., Disp: , Rfl:   •  omeprazole (priLOSEC) 40 MG capsule, Take 1 capsule by mouth Every Night for 180 days., Disp: 30 capsule, Rfl: 5  •  ondansetron ODT (ZOFRAN-ODT) 4 MG disintegrating tablet, Place 1 tablet on the tongue Every 8 (Eight) Hours As Needed for Nausea or Vomiting., Disp: 20 tablet, Rfl: 0  •  potassium chloride (K-DUR,KLOR-CON) 10 MEQ CR tablet, Take 1 tablet by mouth Daily for 180 days., Disp: 30 tablet, Rfl: 5  •  ibuprofen (ADVIL,MOTRIN) 600 MG tablet, Take 1 tablet by mouth Every 6 (Six) Hours As Needed for Moderate Pain  for up to 90 days., Disp: 120 tablet, Rfl: 0.        She  denies medication side  "effects.      All of the other chronic condition(s) listed above are stable w/o issues.    /87   Pulse 76   Temp 97.3 °F (36.3 °C)   Resp 16   Ht 157.5 cm (62.01\")   Wt 83.9 kg (185 lb)   LMP 06/20/2017 (Exact Date)   SpO2 97%   BMI 33.83 kg/m²     Results for orders placed or performed in visit on 08/11/21   TSH+Free T4    Specimen: Blood   Result Value Ref Range    TSH 3.330 0.450 - 4.500 uIU/mL    Free T4 1.24 0.82 - 1.77 ng/dL   Electrolyte Panel    Specimen: Blood   Result Value Ref Range    Sodium 138 134 - 144 mmol/L    Potassium 3.8 3.5 - 5.2 mmol/L    Chloride 99 96 - 106 mmol/L    Total CO2 24 20 - 29 mmol/L   TSH+Free T4    Specimen: Blood   Result Value Ref Range    TSH 3.330 0.450 - 4.500 uIU/mL    Free T4 1.24 0.82 - 1.77 ng/dL                  Objective     Vital Signs:   /87   Pulse 76   Temp 97.3 °F (36.3 °C)   Resp 16   Ht 157.5 cm (62.01\")   Wt 83.9 kg (185 lb)   SpO2 97%   BMI 33.83 kg/m²      Review of Systems   Constitutional: Negative for appetite change, chills and fever.   HENT: Negative for nosebleeds and trouble swallowing.    Eyes: Negative for blurred vision and visual disturbance.   Respiratory: Negative for choking, shortness of breath and wheezing.    Cardiovascular: Negative for chest pain and palpitations.   Gastrointestinal: Negative for abdominal pain and blood in stool.   Genitourinary: Positive for breast pain (bilateral breast tenderness) and vaginal bleeding. Negative for breast discharge, breast lump, decreased urine volume, difficulty urinating, dysuria, frequency, genital sores, hematuria, pelvic pain, pelvic pressure, urgency, urinary incontinence, vaginal discharge and vaginal pain.   Musculoskeletal: Positive for back pain.   Skin: Negative.  Negative for rash.   Allergic/Immunologic: Negative for immunocompromised state.   Neurological: Negative for dizziness, syncope, facial asymmetry, speech difficulty, weakness and headache. "   Hematological: Negative for adenopathy.   Psychiatric/Behavioral: Negative for dysphoric mood, self-injury and suicidal ideas.         Physical Exam  Vitals and nursing note reviewed.   Constitutional:       General: She is not in acute distress.     Appearance: Normal appearance. She is well-developed and well-groomed. She is obese. She is not ill-appearing, toxic-appearing or diaphoretic.   HENT:      Head: Normocephalic and atraumatic. No right periorbital erythema or left periorbital erythema.      Right Ear: Tympanic membrane, ear canal and external ear normal. There is no impacted cerumen.      Left Ear: Tympanic membrane, ear canal and external ear normal. There is no impacted cerumen.      Nose: Nose normal. No congestion or rhinorrhea.      Mouth/Throat:      Mouth: Mucous membranes are moist.      Pharynx: Oropharynx is clear. No oropharyngeal exudate or posterior oropharyngeal erythema.   Eyes:      General: Vision grossly intact. No visual field deficit or scleral icterus.        Right eye: No discharge.         Left eye: No discharge.      Extraocular Movements: Extraocular movements intact.      Right eye: Normal extraocular motion and no nystagmus.      Left eye: Normal extraocular motion and no nystagmus.      Conjunctiva/sclera: Conjunctivae normal.      Right eye: Right conjunctiva is not injected. No exudate.     Left eye: Left conjunctiva is not injected. No exudate.     Pupils: Pupils are equal, round, and reactive to light.   Neck:      Thyroid: No thyromegaly.      Vascular: No carotid bruit.      Trachea: Trachea normal.   Cardiovascular:      Rate and Rhythm: Normal rate and regular rhythm.      Pulses: Normal pulses.           Carotid pulses are 2+ on the right side and 2+ on the left side.       Radial pulses are 2+ on the right side and 2+ on the left side.        Femoral pulses are 2+ on the right side and 2+ on the left side.       Popliteal pulses are 2+ on the right side and 2+ on  the left side.        Dorsalis pedis pulses are 2+ on the right side and 2+ on the left side.        Posterior tibial pulses are 2+ on the right side and 2+ on the left side.      Heart sounds: Normal heart sounds, S1 normal and S2 normal. No murmur heard.      Pulmonary:      Effort: Pulmonary effort is normal. No respiratory distress.      Breath sounds: Normal breath sounds. No stridor. No decreased breath sounds or wheezing.   Chest:      Chest wall: No tenderness or edema.   Breasts:      Right: No supraclavicular adenopathy.      Left: No supraclavicular adenopathy.       Abdominal:      General: Abdomen is flat. Bowel sounds are normal. There is no distension.      Palpations: Abdomen is soft. There is no mass.      Tenderness: There is no abdominal tenderness. There is no guarding.   Musculoskeletal:         General: No swelling, tenderness, deformity or signs of injury. Normal range of motion.      Right shoulder: Normal. Normal range of motion.      Left shoulder: Normal. Normal range of motion.      Right upper arm: Normal. No edema.      Left upper arm: Normal. No edema.      Right elbow: Normal. Normal range of motion.      Left elbow: Normal. Normal range of motion.      Right forearm: Normal. No edema.      Left forearm: Normal. No edema.      Right wrist: Normal. Normal range of motion.      Left wrist: Normal. Normal range of motion.      Right hand: Normal. Normal range of motion.      Left hand: Normal. Normal range of motion.      Cervical back: Full passive range of motion without pain, normal range of motion and neck supple. No edema, erythema, rigidity or tenderness. Normal range of motion.      Right lower leg: No edema.      Left lower leg: No edema.   Lymphadenopathy:      Head:      Right side of head: No submental, submandibular, preauricular, posterior auricular or occipital adenopathy.      Left side of head: No submental, submandibular, preauricular, posterior auricular or occipital  adenopathy.      Cervical: No cervical adenopathy.      Right cervical: No superficial, deep or posterior cervical adenopathy.     Left cervical: No superficial, deep or posterior cervical adenopathy.      Upper Body:      Right upper body: No supraclavicular adenopathy.      Left upper body: No supraclavicular adenopathy.   Skin:     General: Skin is warm and dry.      Capillary Refill: Capillary refill takes less than 2 seconds.      Coloration: Skin is not jaundiced.      Findings: No bruising, erythema or rash.   Neurological:      General: No focal deficit present.      Mental Status: She is alert and oriented to person, place, and time.      Sensory: No sensory deficit.      Motor: No weakness.      Coordination: Romberg sign negative. Coordination normal. Finger-Nose-Finger Test normal.      Gait: Gait and tandem walk normal.      Deep Tendon Reflexes: Reflexes normal.      Reflex Scores:       Brachioradialis reflexes are 2+ on the right side and 2+ on the left side.       Patellar reflexes are 2+ on the right side and 2+ on the left side.       Achilles reflexes are 2+ on the right side and 2+ on the left side.  Psychiatric:         Attention and Perception: Attention normal.         Mood and Affect: Mood and affect normal. Mood is not anxious.         Speech: Speech normal.         Behavior: Behavior normal. Behavior is cooperative.         Thought Content: Thought content normal.         Cognition and Memory: Cognition normal. Cognition is not impaired.         Judgment: Judgment normal.          Result Review :                Assessment and Plan      Diagnoses and all orders for this visit:    1. Annual physical exam (Primary)    2. Postmenopausal bleeding  -     Ambulatory Referral to Gynecology            Follow Up     Return in about 6 months (around 6/7/2022) for Next scheduled follow up.    Patient was given instructions and counseling regarding her condition or for health maintenance advice. Please  see specific information pulled into the AVS if appropriate.     Preventative counseling provided during visit regarding healthy diet, daily exercise, and the following:  Low glycemic index diet  Exercise 30 minutes most days of the week  Make sure you get results on any labs or tests we ordered today  We discussed medications and how to take them as prescribed  Sleep 6-8 hours each night if possible  If you have not signed up for [employer], please activate your code ASAP from your After Visit Summary today    LDL goal <100  HDL goal >60  Triglyceride goal <150  BP goal =<130/80  Fasting glucose <100    Plan:  -Complete lab work that was ordered by PCP Dr. Hunter Sánchez on 09/01/2021.  -Work on daily exercise and weight loss.  -The patient's last mammogram was on 01/28/2021 and was normal.  -Urgent referral to Gynecology due to post-menopausal bleeding and for pap smear.  Will refer to gynecology for Pap smear and further evaluation of postmenopausal bleeding.  -Follow-up with PCP Dr. Hunter Sánchez in 6 months, and sooner if necessary.

## 2021-12-28 ENCOUNTER — TELEPHONE (OUTPATIENT)
Dept: FAMILY MEDICINE CLINIC | Facility: CLINIC | Age: 56
End: 2021-12-28

## 2021-12-28 NOTE — TELEPHONE ENCOUNTER
Caller: Oseas Brasher    Relationship to patient: Self    Best call back number: [phone]    Patient is needing: PATIENT WAS SEEN IN OFFICE 12-7-21, COULD NOT COMPLETE PAP SMEAR THAT DAY. PATIENT HAS APPOINTMENT 1-12-21 ASKING IF SHE CAN ADD PAP TO THAT APPOINTMENT. HUB CAN'T SCHEDULE PHYSICAL WITH PAP  BECAUSE 12-7-21 VISIT WAS DONE AS PHYSICAL.    ATTEMPTED WARM TRANSFER, UNSUCCESSFUL     PLEASE ADVISE

## 2022-01-18 ENCOUNTER — PRIOR AUTHORIZATION (OUTPATIENT)
Dept: FAMILY MEDICINE CLINIC | Facility: CLINIC | Age: 57
End: 2022-01-18

## 2022-01-18 NOTE — TELEPHONE ENCOUNTER
PA rcvd from [facility] for Farxiga  *Drug is covered by current benefit plan. No further PA activity needed     Leanna

## 2022-01-25 ENCOUNTER — TRANSCRIBE ORDERS (OUTPATIENT)
Dept: ADMINISTRATIVE | Facility: HOSPITAL | Age: 57
End: 2022-01-25

## 2022-01-25 DIAGNOSIS — Z12.31 VISIT FOR SCREENING MAMMOGRAM: Primary | ICD-10-CM

## 2022-02-04 ENCOUNTER — TELEMEDICINE (OUTPATIENT)
Dept: FAMILY MEDICINE CLINIC | Facility: CLINIC | Age: 57
End: 2022-02-04

## 2022-02-04 DIAGNOSIS — E11.9 TYPE 2 DIABETES MELLITUS WITHOUT COMPLICATION, WITHOUT LONG-TERM CURRENT USE OF INSULIN: ICD-10-CM

## 2022-02-04 DIAGNOSIS — M79.672 LEFT FOOT PAIN: ICD-10-CM

## 2022-02-04 DIAGNOSIS — N64.4 BREAST PAIN, RIGHT: ICD-10-CM

## 2022-02-04 DIAGNOSIS — E87.6 HYPOKALEMIA: ICD-10-CM

## 2022-02-04 DIAGNOSIS — F41.9 ANXIETY: ICD-10-CM

## 2022-02-04 DIAGNOSIS — E78.2 MIXED HYPERLIPIDEMIA: ICD-10-CM

## 2022-02-04 DIAGNOSIS — R11.0 NAUSEA: ICD-10-CM

## 2022-02-04 DIAGNOSIS — I10 ESSENTIAL HYPERTENSION: ICD-10-CM

## 2022-02-04 PROCEDURE — 99214 OFFICE O/P EST MOD 30 MIN: CPT | Performed by: NURSE PRACTITIONER

## 2022-02-04 RX ORDER — DAPAGLIFLOZIN 10 MG/1
10 TABLET, FILM COATED ORAL
Qty: 30 TABLET | Refills: 5 | Status: SHIPPED | OUTPATIENT
Start: 2022-02-04 | End: 2022-08-03

## 2022-02-04 RX ORDER — CARVEDILOL 12.5 MG/1
12.5 TABLET ORAL 2 TIMES DAILY WITH MEALS
Qty: 60 TABLET | Refills: 5 | Status: SHIPPED | OUTPATIENT
Start: 2022-02-04 | End: 2022-08-03

## 2022-02-04 RX ORDER — DULOXETIN HYDROCHLORIDE 60 MG/1
60 CAPSULE, DELAYED RELEASE ORAL DAILY
Qty: 30 CAPSULE | Refills: 4 | Status: SHIPPED | OUTPATIENT
Start: 2022-02-04 | End: 2023-03-10

## 2022-02-04 RX ORDER — ONDANSETRON 4 MG/1
4 TABLET, ORALLY DISINTEGRATING ORAL EVERY 8 HOURS PRN
Qty: 20 TABLET | Refills: 0 | Status: SHIPPED | OUTPATIENT
Start: 2022-02-04

## 2022-02-04 RX ORDER — AMLODIPINE BESYLATE 5 MG/1
5 TABLET ORAL DAILY
Qty: 30 TABLET | Refills: 5 | Status: SHIPPED | OUTPATIENT
Start: 2022-02-04 | End: 2022-08-03

## 2022-02-04 RX ORDER — HYDROCHLOROTHIAZIDE 25 MG/1
25 TABLET ORAL DAILY
Qty: 30 TABLET | Refills: 5 | Status: SHIPPED | OUTPATIENT
Start: 2022-02-04 | End: 2022-08-03

## 2022-02-04 RX ORDER — POTASSIUM CHLORIDE 750 MG/1
10 TABLET, EXTENDED RELEASE ORAL DAILY
Qty: 30 TABLET | Refills: 5 | Status: SHIPPED | OUTPATIENT
Start: 2022-02-04 | End: 2022-08-03

## 2022-02-04 RX ORDER — ATORVASTATIN CALCIUM 20 MG/1
20 TABLET, FILM COATED ORAL NIGHTLY
Qty: 30 TABLET | Refills: 5 | Status: SHIPPED | OUTPATIENT
Start: 2022-02-04 | End: 2022-08-03

## 2022-02-04 NOTE — PROGRESS NOTES
Chief Complaint  Anxiety, Depression, Hypertension, Hyperlipidemia, Heartburn, and needs diagnostic mammogram order    Barrett Romero presents to [facility]  You have chosen to receive care through a telehealth visit.  Do you consent to use a video/audio connection for your medical care today? Yes    56 year old female presented today for follow up and medication refill for :  anxiety, depression,-  Controlled taking cymbalta, xanax as needed on occassion-  Denies any medication side effects, symptoms controlled. No interval change in anxiety.  Denies any si/Hi   Support plan in place.  Duloxetine helped with leg pain  PHQ2-0  PHQ-2 Depression Screening  Little interest or pleasure in doing things?  0   Feeling down, depressed, or hopeless?  0   PHQ-2 Total Score  0        hyperlipidemia-  Last lipid panel 7/21  Total 128, trig 196, hdl 49, ldl 47  -  New order placed per Dr Sánchez for fasting labs to recheck    , HTN - norvasc 5 mg, coreg 12.5 bid, HCTZ 25 daily-  Unchanged.  Unable to check on visit today-  Denies any Ha, blurred vision, flushing, dizziness, chest pain or pressure  Heartburn controlled on omeprazole-  No C/o    She has had some right breast pain-  Needs an order for diagnostic mammogram and US-  Told to call and get changed when scheduling called for screening mammogram    She had some postmenopausal bleeding x 1-2 weeks 12/7/2021-  Resolved-  Appointment scheduled for pap and follow up with OBGYN next month    No other c/o today      She has an active problem list of the following  Patient Active Problem List   Diagnosis   • Anemia   • Anxiety   • Gastroesophageal reflux disease without esophagitis   • Mixed hyperlipidemia   • Essential hypertension   • Type 2 diabetes mellitus without complication, without long-term current use of insulin (HCC)   • Cardiac murmur   • Sleep disturbance   • Class 1 obesity due to excess calories with serious comorbidity  and body mass index (BMI) of 34.0 to 34.9 in adult   • Left foot pain   • Osteoarthritis of left ankle and left foot   • Migraine aura without headache (migraine equivalents)   • Seronegative rheumatoid arthritis of multiple sites (HCC)   • Pain of right humerus       She has been compliant with the following medications  Current Outpatient Medications on File Prior to Visit   Medication Sig Dispense Refill   • ALPRAZolam (XANAX) 0.5 MG tablet Take 0.5 mg by mouth.     • aspirin 81 MG tablet Take 81 mg by mouth daily.     • Cholecalciferol (Vitamin D3) 1.25 MG (32469 UT) capsule      • Cyanocobalamin (Vitamin B-12) 1000 MCG sublingual tablet      • hydroxychloroquine (PLAQUENIL) 200 MG tablet Take 200 mg by mouth.     • ibuprofen (ADVIL,MOTRIN) 600 MG tablet Take 1 tablet by mouth Every 6 (Six) Hours As Needed for Moderate Pain  for up to 90 days. 120 tablet 0   • Omega-3 Fatty Acids (FISH OIL PO) Take  by mouth Daily.     • [DISCONTINUED] amLODIPine (NORVASC) 5 MG tablet Take 1 tablet by mouth Daily for 180 days. 30 tablet 5   • [DISCONTINUED] atorvastatin (LIPITOR) 20 MG tablet Take 1 tablet by mouth Every Night for 180 days. 30 tablet 5   • [DISCONTINUED] carvedilol (COREG) 12.5 MG tablet Take 1 tablet by mouth 2 (Two) Times a Day With Meals for 180 days. 60 tablet 5   • [DISCONTINUED] DULoxetine (CYMBALTA) 60 MG capsule Take 1 capsule by mouth Daily for 150 days. 30 capsule 4   • [DISCONTINUED] Farxiga 10 MG tablet Take 10 mg by mouth Every Morning Before Breakfast for 180 days. 30 tablet 5   • [DISCONTINUED] hydroCHLOROthiazide (HYDRODIURIL) 25 MG tablet Take 1 tablet by mouth Daily for 180 days. 30 tablet 5   • [DISCONTINUED] ondansetron ODT (ZOFRAN-ODT) 4 MG disintegrating tablet Place 1 tablet on the tongue Every 8 (Eight) Hours As Needed for Nausea or Vomiting. 20 tablet 0   • [DISCONTINUED] potassium chloride (K-DUR,KLOR-CON) 10 MEQ CR tablet Take 1 tablet by mouth Daily for 180 days. 30 tablet 5   •  "omeprazole (priLOSEC) 40 MG capsule Take 1 capsule by mouth Every Night for 180 days. 30 capsule 5     No current facility-administered medications on file prior to visit.       She denies any medication side effects    The following portions of the patient's history were reviewed and updated as appropriate: allergies, current medications, past family history, past medical history, past social history, past surgical history, and problem list          Review of Systems   Constitutional: Negative for chills, fatigue and fever.   Eyes: Negative for visual disturbance.   Respiratory: Negative for cough and shortness of breath.    Cardiovascular: Negative for chest pain, palpitations and leg swelling.   Gastrointestinal: Negative for abdominal pain, diarrhea, nausea and vomiting.   Skin: Negative for wound.   Neurological: Negative for dizziness and light-headedness.        Objective   Vital Signs: unable to obtain  Height 62\"  Weight 185  BMI 33.8  There were no vitals filed for this visit.     Virtual  Physical Exam  Constitutional:       General: She is not in acute distress.     Appearance: She is not ill-appearing.   Pulmonary:      Effort: Pulmonary effort is normal. No respiratory distress.   Neurological:      Mental Status: She is alert and oriented to person, place, and time.   Psychiatric:         Mood and Affect: Mood normal.          Result Review :     The following data was reviewed by: FRANNIE Navarro on 02/04/2022:      Comprehensive metabolic panel (01/07/2022 13:25)  CBC and differential (01/07/2022 13:25)  TSH+Free T4 (08/27/2021 11:22)  Hemoglobin A1c (07/27/2021 10:19)  Lipid Panel With / Chol / HDL Ratio (07/27/2021 10:19)    Glucose 120  Creatinine 0.58  Sodium 134  Normal cbc-  1/7/2022  Last thyroid panel normal  8/21  HA1c 5.9       Assessment and Plan    Diagnoses and all orders for this visit:    1. Breast pain, right (Primary)  -     Mammo Diagnostic Bilateral With CAD; Future  -     " US Breast Right Limited; Future  Order changed to diagnostic mammo and us right breast    2. Essential hypertension  -     amLODIPine (NORVASC) 5 MG tablet; Take 1 tablet by mouth Daily for 180 days.  Dispense: 30 tablet; Refill: 5  -     carvedilol (COREG) 12.5 MG tablet; Take 1 tablet by mouth 2 (Two) Times a Day With Meals for 180 days.  Dispense: 60 tablet; Refill: 5  -     hydroCHLOROthiazide (HYDRODIURIL) 25 MG tablet; Take 1 tablet by mouth Daily for 180 days.  Dispense: 30 tablet; Refill: 5    Hypertension is unchanged.  Continue current treatment regimen.  Blood pressure will be reassessed at the next regular appointment.    3. Mixed hyperlipidemia  -     atorvastatin (LIPITOR) 20 MG tablet; Take 1 tablet by mouth Every Night for 180 days.  Dispense: 30 tablet; Refill: 5  Last lipid panel 7/21  Total 128, trig 196, hdl 49, ldl 47  -  New order placed per Dr Sánchez for fasting labs to recheck    4. Anxiety  -     DULoxetine (CYMBALTA) 60 MG capsule; Take 1 capsule by mouth Daily for 150 days.  Dispense: 30 capsule; Refill: 4  Stable. Change medication to duloxetine. Start with 60 mg tablet.  5. Left foot pain  -     DULoxetine (CYMBALTA) 60 MG capsule; Take 1 capsule by mouth Daily for 150 days.  Dispense: 30 capsule; Refill: 4    6. Type 2 diabetes mellitus without complication, without long-term current use of insulin (HCC)  -     Farxiga 10 MG tablet; Take 10 mg by mouth Every Morning Before Breakfast for 180 days.  Dispense: 30 tablet; Refill: 5  Diabetes is unchanged.   Continue current treatment regimen              Last HA1c 5.9    7. Nausea  -     ondansetron ODT (ZOFRAN-ODT) 4 MG disintegrating tablet; Place 1 tablet on the tongue Every 8 (Eight) Hours As Needed for Nausea or Vomiting.  Dispense: 20 tablet; Refill: 0  occassionly-  none at present time-  with polymeds  8. Hypokalemia  -     potassium chloride (K-DUR,KLOR-CON) 10 MEQ CR tablet; Take 1 tablet by mouth Daily for 180 days.  Dispense: 30  tablet; Refill: 5  Lasat K 4.2-  ordered to recheck      plan  Refill medications  Low glycemic index diet  Exercise 30 minutes most days of the week  Make sure you get results on any labs or tests we ordered today  We discussed medications and how to take them as prescribed  Sleep 6-8 hours each night if possible  If you have not signed up for [employer], please activate your code ASAP from your After Visit Summary today     LDL goal <100  LDL goal if heart disease <70  HDL goal >60  Triglyceride goal <150  BP goal =<130/80  Fasting glucose <100    This was an audio and video enabled telemedicine encounter.  Video did cut out several times-  Able to reconnect  Video visit lasted approx 16 minutes      Follow Up   Return in about 6 months (around 8/4/2022), or if symptoms worsen or fail to improve.  Patient was given instructions and counseling regarding her condition or for health maintenance advice. Please see specific information pulled into the AVS if appropriate.

## 2022-02-23 ENCOUNTER — LAB (OUTPATIENT)
Dept: LAB | Facility: HOSPITAL | Age: 57
End: 2022-02-23

## 2022-02-23 PROCEDURE — 83036 HEMOGLOBIN GLYCOSYLATED A1C: CPT | Performed by: FAMILY MEDICINE

## 2022-02-23 PROCEDURE — 82550 ASSAY OF CK (CPK): CPT | Performed by: FAMILY MEDICINE

## 2022-02-23 PROCEDURE — 80050 GENERAL HEALTH PANEL: CPT | Performed by: FAMILY MEDICINE

## 2022-02-23 PROCEDURE — 82043 UR ALBUMIN QUANTITATIVE: CPT | Performed by: FAMILY MEDICINE

## 2022-02-23 PROCEDURE — 80061 LIPID PANEL: CPT | Performed by: FAMILY MEDICINE

## 2022-03-03 ENCOUNTER — OFFICE VISIT (OUTPATIENT)
Dept: OBSTETRICS AND GYNECOLOGY | Facility: CLINIC | Age: 57
End: 2022-03-03

## 2022-03-03 VITALS
SYSTOLIC BLOOD PRESSURE: 120 MMHG | HEIGHT: 62 IN | WEIGHT: 183 LBS | DIASTOLIC BLOOD PRESSURE: 80 MMHG | BODY MASS INDEX: 33.68 KG/M2

## 2022-03-03 DIAGNOSIS — N95.0 POST-MENOPAUSAL BLEEDING: ICD-10-CM

## 2022-03-03 DIAGNOSIS — I10 ESSENTIAL HYPERTENSION: ICD-10-CM

## 2022-03-03 DIAGNOSIS — Z01.419 ROUTINE GYNECOLOGICAL EXAMINATION: Primary | ICD-10-CM

## 2022-03-03 DIAGNOSIS — E11.9 TYPE 2 DIABETES MELLITUS WITHOUT COMPLICATION, WITHOUT LONG-TERM CURRENT USE OF INSULIN: ICD-10-CM

## 2022-03-03 PROCEDURE — 99386 PREV VISIT NEW AGE 40-64: CPT | Performed by: OBSTETRICS & GYNECOLOGY

## 2022-03-03 RX ORDER — HYDROXYCHLOROQUINE SULFATE 200 MG/1
200 TABLET, FILM COATED ORAL
COMMUNITY
Start: 2022-01-07 | End: 2022-03-03 | Stop reason: SDUPTHER

## 2022-03-03 NOTE — PROGRESS NOTES
GYN Annual Exam     CC - Here for annual exam. She was scheduled for post menopausal bleeding that has occurred twice, once in Dec 2021 and the last episode was in 2022. The first episode was spotting, but the last episode in January lasted 7 days and was heavy like a period.       AGATA Brasher is a 56 y.o. female, No obstetric history on file., who presents for annual well woman exam.  She is postmenopausal.  Patient reports vaginal bleeding.  She reports the bleeding as moderate. It has previously occurred twice since the end of .  Patient has been using none.  Patient also complains of none.  ..  Patient reports problems with: post menopausal bleeding. There were no changes to her medical or surgical history since her last visit.. Partner Status: Marital Status: single.  New Partners since last visit: no.        Her T2DM is well controlled, last a1c was 5.6%ile.     Additional OB/GYN History   Current contraception: contraceptive methods: Post menopausal status  Desires to: continue contraception  On HRT? No  Last Pap:  negative  Last Completed Pap Smear          Ordered - PAP SMEAR (Every 3 Years) Ordered on 3/3/2022    2021  Done    2017  PapIG, HPV, Rfx 16 / 18    01/15/2014  HM Pap smear component of HM PAP SMEAR              History of abnormal Pap smear: yes - several years ago cryo x2  Family history of uterine, colon, breast, or ovarian cancer: no  Performs monthly Self-Breast Exam: yes  Last mammogram: a couple years ago. Negative Scheduled this month  Last Completed Mammogram     This patient has no relevant Health Maintenance data.        Last colonoscopy: 2018 Pre-cancerous polyps RTO 5yrs  Last Completed Colonoscopy     This patient has no relevant Health Maintenance data.        Last DEXA: None  Exercises Regularly: no  Feelings of Anxiety or Depression: no      Tobacco Usage?: No   OB History             Para        Term   0            AB        Living  "          SAB        IAB        Ectopic        Molar        Multiple        Live Births                    Health Maintenance   Topic Date Due   • Hepatitis B (1 of 3 - Risk 3-dose series) Never done   • Annual Gynecologic Pelvic and Breast Exam  04/20/2018   • MAMMOGRAM  01/28/2022   • COLORECTAL CANCER SCREENING  03/01/2022   • DIABETIC EYE EXAM  07/12/2022   • HEMOGLOBIN A1C  08/23/2022   • DIABETIC FOOT EXAM  12/07/2022   • ANNUAL PHYSICAL  12/08/2022   • LIPID PANEL  02/23/2023   • URINE MICROALBUMIN  02/23/2023   • TDAP/TD VACCINES (2 - Td or Tdap) 01/09/2024   • PAP SMEAR  12/07/2024   • Pneumococcal Vaccine 0-64 (2 of 2 - PPSV23) 09/09/2028   • HEPATITIS C SCREENING  Completed   • COVID-19 Vaccine  Completed   • INFLUENZA VACCINE  Completed   • ZOSTER VACCINE  Completed       The additional following portions of the patient's history were reviewed and updated as appropriate: problem list.    Review of Systems   Genitourinary: Positive for menstrual problem.   All other systems reviewed and are negative.      I have reviewed and agree with the HPI, ROS, and historical information as entered above. Dyan Basilio MD    Objective   /80   Ht 157.5 cm (62\")   Wt 83 kg (183 lb)   LMP 06/20/2017 (Exact Date)   BMI 33.47 kg/m²     Physical Exam  Vitals and nursing note reviewed. Exam conducted with a chaperone present.   Constitutional:       Appearance: She is well-developed.   HENT:      Head: Normocephalic and atraumatic.   Eyes:      Pupils: Pupils are equal, round, and reactive to light.   Neck:      Thyroid: No thyroid mass or thyromegaly.   Pulmonary:      Effort: Pulmonary effort is normal. No retractions.   Chest:      Chest wall: No mass.   Breasts:      Right: Normal. No mass, nipple discharge, skin change or tenderness.      Left: Normal. No mass, nipple discharge, skin change or tenderness.       Abdominal:      General: Bowel sounds are normal.      Palpations: Abdomen is soft. Abdomen is not " rigid. There is no mass.      Tenderness: There is no abdominal tenderness. There is no guarding.      Hernia: No hernia is present. There is no hernia in the left inguinal area or right inguinal area.   Genitourinary:     Exam position: Lithotomy position.      Pubic Area: No rash.       Labia:         Right: No rash, tenderness or lesion.         Left: No rash, tenderness or lesion.       Urethra: No urethral pain or urethral swelling.      Vagina: Normal. No vaginal discharge or lesions.      Cervix: No cervical motion tenderness, discharge, lesion or cervical bleeding.      Uterus: Normal. Not enlarged, not fixed and not tender.       Adnexa:         Right: No mass, tenderness or fullness.          Left: No mass, tenderness or fullness.        Rectum: No external hemorrhoid.   Musculoskeletal:      Cervical back: Normal range of motion. No muscular tenderness.      Right lower leg: No edema.      Left lower leg: No edema.   Skin:     General: Skin is warm and dry.   Neurological:      Mental Status: She is alert and oriented to person, place, and time.      Motor: Motor function is intact.   Psychiatric:         Mood and Affect: Mood and affect normal.         Behavior: Behavior normal.            Assessment and Plan    Problem List Items Addressed This Visit     Essential hypertension    Relevant Medications    amLODIPine (NORVASC) 5 MG tablet    carvedilol (COREG) 12.5 MG tablet    hydroCHLOROthiazide (HYDRODIURIL) 25 MG tablet    Type 2 diabetes mellitus without complication, without long-term current use of insulin (HCC)    Relevant Medications    Farxiga 10 MG tablet      Other Visit Diagnoses     Routine gynecological examination    -  Primary    Relevant Orders    Liquid-based Pap Smear, Screening    Mammo Screening Digital Tomosynthesis Bilateral With CAD    Post-menopausal bleeding        Relevant Orders    US Non-ob Transvaginal          1. GYN annual well woman exam.   2. Reviewed monthly self breast  exams.  Instructed to call with lumps, pain, or breast discharge.  Yearly mammograms ordered.  3. Ordered mammogram today.  4. Osteoporosis screening ordered today.  5. Reviewed BMI and weight loss as preventative health measures.    6. 2 recent episodes of PMB. Exam and pap today. Will FU for US and endometrial biopsy to [facility].  7.  Return in about 2 weeks (around 3/17/2022) for U/S Next Visit and endometrial biopsy, Schedule DEXA.     Dyan Basilio MD  03/03/2022

## 2022-03-17 ENCOUNTER — OFFICE VISIT (OUTPATIENT)
Dept: OBSTETRICS AND GYNECOLOGY | Facility: CLINIC | Age: 57
End: 2022-03-17

## 2022-03-17 VITALS
HEIGHT: 62 IN | WEIGHT: 184.4 LBS | DIASTOLIC BLOOD PRESSURE: 62 MMHG | SYSTOLIC BLOOD PRESSURE: 120 MMHG | BODY MASS INDEX: 33.93 KG/M2

## 2022-03-17 DIAGNOSIS — Z13.820 OSTEOPOROSIS SCREENING: ICD-10-CM

## 2022-03-17 DIAGNOSIS — N95.0 PMB (POSTMENOPAUSAL BLEEDING): Primary | ICD-10-CM

## 2022-03-17 DIAGNOSIS — Z01.419 WELL WOMAN EXAM WITH ROUTINE GYNECOLOGICAL EXAM: Primary | ICD-10-CM

## 2022-03-17 PROCEDURE — 58100 BIOPSY OF UTERUS LINING: CPT | Performed by: OBSTETRICS & GYNECOLOGY

## 2022-03-17 PROCEDURE — 99213 OFFICE O/P EST LOW 20 MIN: CPT | Performed by: OBSTETRICS & GYNECOLOGY

## 2022-03-17 NOTE — PROGRESS NOTES
Chief Complaint   Patient presents with   • Procedure            HPI  Dannrstan Brasher is a 56 y.o. female, No obstetric history on file., who presents with recurrent evaluation of postmenopausal bleeding.      Her last LMP was  years ago.    She states she has experienced this problem for 4 months.  She describes the severity as spotting, moderate occurring Infrequently The patient reports additional symptoms as none.  The patient has been evaluated: with TVUS, performed 03/17/2022, result emc 4.6mm. There was also 2 small fibroids, largest 1.6cm.  Patient has been using none.     BDS today wnl.       Additional OB/GYN History   Last Pap : 03/03/2022 ASCUS HPV+  Last Completed Pap Smear          PAP SMEAR (Every 3 Years) Next due on 3/3/2025    03/03/2022  SCANNED - PAP SMEAR    12/07/2021  Done    04/19/2017  PapIG, HPV, Rfx 16 / 18    01/15/2014   Pap smear component of  PAP SMEAR              History of abnormal Pap smear: yes - ASCUS HPV +  Tobacco Usage?: No     The additional following portions of the patient's history were reviewed and updated as appropriate: allergies, current medications, past family history, past medical history, past social history, past surgical history and problem list.    Review of Systems   Constitutional: Negative for chills, fatigue, unexpected weight gain and unexpected weight loss.   HENT: Negative for sore throat and swollen glands.    Respiratory: Negative for cough and shortness of breath.    Cardiovascular: Negative for chest pain, palpitations and leg swelling.   Gastrointestinal: Negative for abdominal distention, abdominal pain, blood in stool, constipation, diarrhea and nausea.   Endocrine: Negative for cold intolerance and heat intolerance.   Genitourinary: Negative for breast discharge, breast lump, frequency, hematuria, pelvic pain, vaginal bleeding, vaginal discharge and vaginal pain.   Musculoskeletal: Negative for gait problem and myalgias.   Allergic/Immunologic:  "Negative for immunocompromised state.   Neurological: Negative for dizziness, headache and confusion.   Psychiatric/Behavioral: Negative for suicidal ideas and depressed mood.     All other systems reviewed and are negative.     I have reviewed and agree with the HPI, ROS, and historical information as entered above. Dyan Basilio MD    Objective   /62   Ht 157.5 cm (62\")   Wt 83.6 kg (184 lb 6.4 oz)   LMP 06/20/2017 (Exact Date)   BMI 33.73 kg/m²     Physical Exam  Vitals and nursing note reviewed. Exam conducted with a chaperone present.   Constitutional:       Appearance: Normal appearance. She is well-developed.   HENT:      Head: Normocephalic and atraumatic.      Nose: Nose normal. No congestion or rhinorrhea.   Eyes:      General: No scleral icterus.     Pupils: Pupils are equal, round, and reactive to light.   Pulmonary:      Effort: Pulmonary effort is normal.      Breath sounds: Normal breath sounds.   Abdominal:      General: There is no distension.      Palpations: Abdomen is soft.      Tenderness: There is no abdominal tenderness. There is no guarding or rebound.   Genitourinary:     General: Normal vulva.      Exam position: Lithotomy position.      Labia:         Right: No rash or lesion.         Left: No rash or lesion.       Urethra: No prolapse.      Vagina: Normal.      Cervix: No cervical motion tenderness, discharge, lesion or cervical bleeding.      Uterus: Normal. Not tender.       Adnexa: Right adnexa normal and left adnexa normal.        Right: No tenderness.          Left: No tenderness.        Rectum: Normal.   Musculoskeletal:         General: No swelling. Normal range of motion.      Cervical back: Normal range of motion and neck supple.   Skin:     General: Skin is warm and dry.   Neurological:      General: No focal deficit present.      Mental Status: She is alert and oriented to person, place, and time.   Psychiatric:         Mood and Affect: Mood normal.         Behavior: " Behavior normal. Behavior is cooperative.            Assessment and Plan    Procedures    After the indications, risks, benefits, and alternatives to performing and endometrial biopsy were explained to the patient, opted to move forward with the endometrial biopsy. A urine pregnancy was negative    PROCEDURE:  The patient was placed on the table in the supine lithotomy position.  She was draped in the appropriate manner.  A speculum was placed in the vagina.  The cervix was visualized and prepped with Betadine. A tenaculum was placed. A small plastic 5 mm Pipelle syringe curette was inserted into the cervical canal.  The uterus was sounded to 7 cms.  A vigorous four quadrant biopsy was performed, removing a small amount of tissue.  This tissue was placed in Formalin and sent to pathology.  The patient tolerated the procedure well and reported Mild cramping.  She had No cramping at the time of discharge.    Problem List Items Addressed This Visit    None     Visit Diagnoses     PMB (postmenopausal bleeding)    -  Primary    Relevant Orders    Tissue Pathology Exam          1. .PMB- emc measrues 4.6mm on US today, endometrial biopsy today. Will call with results.   2. Dexa wnl  3. Ascus hpv+ pap, will sched colpo/.   4. Call the office in 5 business days for biopsy results.  5. Patient instructed to call the office if develops a fever of 100.4 or greater, vaginal bleeding heavier than a period, foul vaginal discharge or pain.    Dyan Basilio MD  03/17/2022

## 2022-03-22 ENCOUNTER — HOSPITAL ENCOUNTER (OUTPATIENT)
Dept: ULTRASOUND IMAGING | Facility: HOSPITAL | Age: 57
Discharge: HOME | End: 2022-03-22

## 2022-03-22 ENCOUNTER — HOSPITAL ENCOUNTER (OUTPATIENT)
Dept: MAMMOGRAPHY | Facility: HOSPITAL | Age: 57
Discharge: HOME | End: 2022-03-22

## 2022-03-22 ENCOUNTER — TRANSCRIBE ORDERS (OUTPATIENT)
Dept: MAMMOGRAPHY | Facility: HOSPITAL | Age: 57
End: 2022-03-22

## 2022-03-22 DIAGNOSIS — N64.4 BREAST PAIN, RIGHT: ICD-10-CM

## 2022-03-22 DIAGNOSIS — R92.8 ABNORMAL MAMMOGRAM: Primary | ICD-10-CM

## 2022-03-22 DIAGNOSIS — N95.0 PMB (POSTMENOPAUSAL BLEEDING): ICD-10-CM

## 2022-03-22 PROCEDURE — 76642 ULTRASOUND BREAST LIMITED: CPT | Performed by: RADIOLOGY

## 2022-03-22 PROCEDURE — 76642 ULTRASOUND BREAST LIMITED: CPT

## 2022-03-22 PROCEDURE — G0279 TOMOSYNTHESIS, MAMMO: HCPCS

## 2022-03-22 PROCEDURE — 77066 DX MAMMO INCL CAD BI: CPT

## 2022-03-22 PROCEDURE — 77066 DX MAMMO INCL CAD BI: CPT | Performed by: RADIOLOGY

## 2022-03-22 PROCEDURE — 77062 BREAST TOMOSYNTHESIS BI: CPT | Performed by: RADIOLOGY

## 2022-04-20 ENCOUNTER — TRANSCRIBE ORDERS (OUTPATIENT)
Dept: MAMMOGRAPHY | Facility: HOSPITAL | Age: 57
End: 2022-04-20

## 2022-04-20 ENCOUNTER — HOSPITAL ENCOUNTER (OUTPATIENT)
Dept: ULTRASOUND IMAGING | Facility: HOSPITAL | Age: 57
Discharge: HOME | End: 2022-04-20
Admitting: RADIOLOGY

## 2022-04-20 DIAGNOSIS — R92.8 ABNORMAL MAMMOGRAM: ICD-10-CM

## 2022-04-20 DIAGNOSIS — R92.8 ABNORMAL MAMMOGRAM: Primary | ICD-10-CM

## 2022-04-20 PROCEDURE — 0 LIDOCAINE 1 % SOLUTION: Performed by: RADIOLOGY

## 2022-04-20 PROCEDURE — 10005 FNA BX W/US GDN 1ST LES: CPT | Performed by: RADIOLOGY

## 2022-04-20 PROCEDURE — 88305 TISSUE EXAM BY PATHOLOGIST: CPT | Performed by: OBSTETRICS & GYNECOLOGY

## 2022-04-20 PROCEDURE — 88173 CYTOPATH EVAL FNA REPORT: CPT | Performed by: OBSTETRICS & GYNECOLOGY

## 2022-04-20 RX ORDER — LIDOCAINE HYDROCHLORIDE 10 MG/ML
5 INJECTION, SOLUTION INFILTRATION; PERINEURAL ONCE
Status: COMPLETED | OUTPATIENT
Start: 2022-04-20 | End: 2022-04-20

## 2022-04-20 RX ADMIN — Medication 3 ML: at 10:21

## 2022-04-20 NOTE — PROGRESS NOTES
Alert and orientated. Denies discomfort. Band-aid in place. No active bleeding. Ice pack given. Verbalizes and demonstrates understanding of post-care instructions. Written copy of instructions given.

## 2022-04-21 ENCOUNTER — OFFICE VISIT (OUTPATIENT)
Dept: OBSTETRICS AND GYNECOLOGY | Facility: CLINIC | Age: 57
End: 2022-04-21

## 2022-04-21 VITALS
HEIGHT: 62 IN | SYSTOLIC BLOOD PRESSURE: 122 MMHG | BODY MASS INDEX: 34.85 KG/M2 | WEIGHT: 189.4 LBS | DIASTOLIC BLOOD PRESSURE: 82 MMHG

## 2022-04-21 DIAGNOSIS — R87.610 ASCUS WITH POSITIVE HIGH RISK HPV CERVICAL: Primary | ICD-10-CM

## 2022-04-21 DIAGNOSIS — R87.810 ASCUS WITH POSITIVE HIGH RISK HPV CERVICAL: Primary | ICD-10-CM

## 2022-04-21 PROCEDURE — 57454 BX/CURETT OF CERVIX W/SCOPE: CPT | Performed by: OBSTETRICS & GYNECOLOGY

## 2022-04-21 NOTE — PROGRESS NOTES
Colposcopy Procedure Note  Procedures    Indications: Oseas Brasher is a 56 y.o. female, No obstetric history on file., whose Patient's last menstrual period was 06/20/2017 (exact date)..  She presents for follow up for evaluation of an abnormal PAP smear that showed ASCUS with POSITIVE high risk HPV.  She understands the need for the procedure and is aware of the complications, including post-colposcopic vaginal bleeding, vaginal leukorrhea or cervicitis.  She is aware she may experience discomfort.  After being presented with the risk, benefits, and alternatives the patient wished to proceed. upt neg     Prior cervical treatment: no treatment.    Procedure Details   The risks and benefits of the procedure and Verbal informed consent obtained.  She was positioned in the dorsal lithotomy position and a speculum was inserted into the vagina and excellent visualization of cervix achieved, cervix swabbed x 3 with acetic acid solution. The transformation zone was completely visualized.  A cervical biopsy was obtained at 7 o'clock.  An endocervical curettage was performed.  This colposcopy was satisfactory.     Findings:  The procedure was notable for:  Physical Exam    Specimens: cervical biopsies  Specimens labelled and sent to Pathology.    Complications: none.    Assessment and Plan    Problem List Items Addressed This Visit    None     Visit Diagnoses     ASCUS with positive high risk HPV cervical    -  Primary    Relevant Orders    Tissue Pathology Exam          1. Will base further treatment on Pathology findings.  2. Treatment options discussed with patient.  3. Post biopsy instructions given to patient.      Dyan Basilio MD  04/21/2022

## 2022-04-25 LAB
LAB AP CASE REPORT: NORMAL
LAB AP CLINICAL INFORMATION: NORMAL
LAB AP FLOW CYTOMETRY SUMMARY: NORMAL
PATH REPORT.FINAL DX SPEC: NORMAL

## 2022-04-26 ENCOUNTER — TELEPHONE (OUTPATIENT)
Dept: MAMMOGRAPHY | Facility: HOSPITAL | Age: 57
End: 2022-04-26

## 2022-07-08 DIAGNOSIS — Z12.11 ENCOUNTER FOR SCREENING COLONOSCOPY: Primary | ICD-10-CM

## 2022-07-25 ENCOUNTER — HOSPITAL ENCOUNTER (OUTPATIENT)
Dept: MAMMOGRAPHY | Facility: HOSPITAL | Age: 57
Discharge: HOME | End: 2022-07-25
Admitting: OBSTETRICS & GYNECOLOGY

## 2022-07-25 DIAGNOSIS — R92.8 ABNORMAL MAMMOGRAM: ICD-10-CM

## 2022-07-25 PROCEDURE — 77065 DX MAMMO INCL CAD UNI: CPT

## 2022-07-25 PROCEDURE — 77065 DX MAMMO INCL CAD UNI: CPT | Performed by: RADIOLOGY

## 2022-09-07 ENCOUNTER — OUTSIDE FACILITY SERVICE (OUTPATIENT)
Dept: GASTROENTEROLOGY | Facility: CLINIC | Age: 57
End: 2022-09-07

## 2022-09-07 PROCEDURE — 45390 COLONOSCOPY W/RESECTION: CPT | Performed by: INTERNAL MEDICINE

## 2022-09-07 PROCEDURE — 88305 TISSUE EXAM BY PATHOLOGIST: CPT | Performed by: INTERNAL MEDICINE

## 2022-09-07 PROCEDURE — 45380 COLONOSCOPY AND BIOPSY: CPT | Performed by: INTERNAL MEDICINE

## 2022-09-07 PROCEDURE — 43239 EGD BIOPSY SINGLE/MULTIPLE: CPT | Performed by: INTERNAL MEDICINE

## 2022-09-07 PROCEDURE — 45388 COLONOSCOPY W/ABLATION: CPT | Performed by: INTERNAL MEDICINE

## 2022-09-07 PROCEDURE — 45385 COLONOSCOPY W/LESION REMOVAL: CPT | Performed by: INTERNAL MEDICINE

## 2022-09-08 ENCOUNTER — LAB REQUISITION (OUTPATIENT)
Dept: LAB | Facility: HOSPITAL | Age: 57
End: 2022-09-08

## 2022-09-08 DIAGNOSIS — K64.8 OTHER HEMORRHOIDS: ICD-10-CM

## 2022-09-08 DIAGNOSIS — K44.9 DIAPHRAGMATIC HERNIA WITHOUT OBSTRUCTION OR GANGRENE: ICD-10-CM

## 2022-09-08 DIAGNOSIS — K31.89 OTHER DISEASES OF STOMACH AND DUODENUM: ICD-10-CM

## 2022-09-08 DIAGNOSIS — D12.7 BENIGN NEOPLASM OF RECTOSIGMOID JUNCTION: ICD-10-CM

## 2022-09-08 DIAGNOSIS — K21.9 GASTRO-ESOPHAGEAL REFLUX DISEASE WITHOUT ESOPHAGITIS: ICD-10-CM

## 2022-09-08 DIAGNOSIS — D12.2 BENIGN NEOPLASM OF ASCENDING COLON: ICD-10-CM

## 2022-09-08 DIAGNOSIS — R14.0 ABDOMINAL DISTENSION (GASEOUS): ICD-10-CM

## 2022-09-08 DIAGNOSIS — Z12.11 ENCOUNTER FOR SCREENING FOR MALIGNANT NEOPLASM OF COLON: ICD-10-CM

## 2022-09-08 DIAGNOSIS — D12.5 BENIGN NEOPLASM OF SIGMOID COLON: ICD-10-CM

## 2022-09-09 LAB
CYTO UR: NORMAL
LAB AP CASE REPORT: NORMAL
LAB AP CLINICAL INFORMATION: NORMAL
PATH REPORT.FINAL DX SPEC: NORMAL
PATH REPORT.GROSS SPEC: NORMAL

## 2022-10-21 ENCOUNTER — APPOINTMENT (OUTPATIENT)
Dept: MAMMOGRAPHY | Facility: HOSPITAL | Age: 57
End: 2022-10-21

## 2022-11-23 ENCOUNTER — HOSPITAL ENCOUNTER (OUTPATIENT)
Dept: GENERAL RADIOLOGY | Facility: HOSPITAL | Age: 57
Discharge: HOME | End: 2022-11-23
Admitting: INTERNAL MEDICINE

## 2022-11-23 ENCOUNTER — TRANSCRIBE ORDERS (OUTPATIENT)
Dept: ADMINISTRATIVE | Facility: HOSPITAL | Age: 57
End: 2022-11-23

## 2022-11-23 DIAGNOSIS — J18.9 PNEUMONIA DUE TO INFECTIOUS ORGANISM, UNSPECIFIED LATERALITY, UNSPECIFIED PART OF LUNG: Primary | ICD-10-CM

## 2022-11-23 PROCEDURE — 71046 X-RAY EXAM CHEST 2 VIEWS: CPT

## 2023-01-23 ENCOUNTER — TRANSCRIBE ORDERS (OUTPATIENT)
Dept: ADMINISTRATIVE | Facility: HOSPITAL | Age: 58
End: 2023-01-23
Payer: COMMERCIAL

## 2023-01-23 DIAGNOSIS — Z12.31 VISIT FOR SCREENING MAMMOGRAM: Primary | ICD-10-CM

## 2023-03-23 ENCOUNTER — HOSPITAL ENCOUNTER (OUTPATIENT)
Dept: MAMMOGRAPHY | Facility: HOSPITAL | Age: 58
Discharge: HOME | End: 2023-03-23
Admitting: INTERNAL MEDICINE
Payer: COMMERCIAL

## 2023-03-23 DIAGNOSIS — Z12.31 VISIT FOR SCREENING MAMMOGRAM: ICD-10-CM

## 2023-03-23 PROCEDURE — 77067 SCR MAMMO BI INCL CAD: CPT

## 2023-03-23 PROCEDURE — 77063 BREAST TOMOSYNTHESIS BI: CPT

## 2023-03-23 PROCEDURE — 77067 SCR MAMMO BI INCL CAD: CPT | Performed by: RADIOLOGY

## 2023-03-23 PROCEDURE — 77063 BREAST TOMOSYNTHESIS BI: CPT | Performed by: RADIOLOGY

## 2023-04-18 ENCOUNTER — OFFICE VISIT (OUTPATIENT)
Dept: OBSTETRICS AND GYNECOLOGY | Facility: CLINIC | Age: 58
End: 2023-04-18
Payer: COMMERCIAL

## 2023-04-18 VITALS
HEIGHT: 62 IN | BODY MASS INDEX: 34.56 KG/M2 | SYSTOLIC BLOOD PRESSURE: 136 MMHG | DIASTOLIC BLOOD PRESSURE: 82 MMHG | WEIGHT: 187.8 LBS

## 2023-04-18 DIAGNOSIS — N87.0 MILD DYSPLASIA OF CERVIX (CIN I): ICD-10-CM

## 2023-04-18 DIAGNOSIS — Z01.419 WELL WOMAN EXAM WITH ROUTINE GYNECOLOGICAL EXAM: Primary | ICD-10-CM

## 2023-04-18 DIAGNOSIS — R87.619 ABNORMAL CERVICAL PAPANICOLAOU SMEAR, UNSPECIFIED ABNORMAL PAP FINDING: ICD-10-CM

## 2023-04-18 PROCEDURE — 99396 PREV VISIT EST AGE 40-64: CPT | Performed by: OBSTETRICS & GYNECOLOGY

## 2023-04-18 NOTE — PROGRESS NOTES
Gynecologic Annual Exam Note        GYN Annual Exam     CC - Here for annual exam.        HPI  Dannrstan Brasher is a 57 y.o. female, No obstetric history on file., who presents for annual well woman exam as a established patient.  She is postmenopausal. Denies vaginal bleeding.  Patient reports problems with: none. There were no changes to her medical or surgical history since her last visit.. Partner Status: Marital Status: single.  She is is not currently sexually active. STD testing recommendations have been explained to the patient and she does not desire STD testing.    Additional OB/GYN History   On HRT? No    Last Pap : 3/3/22. Results: ASCUS. HPV: HPV pool +.   Last Completed Pap Smear          Ordered - PAP SMEAR (Every 3 Years) Ordered on 4/18/2023 03/03/2022  SCANNED - PAP SMEAR    12/07/2021  Done    04/19/2017  PapIG, HPV, Rfx 16 / 18    01/15/2014  HM Pap smear component of HM PAP SMEAR              History of abnormal Pap smear: yes - 2022- colpo--> 4/21/22- mild dysplasia.   Family history of uterine, colon, breast, or ovarian cancer: yes - paternal cousin-ovarian  Performs monthly Self-Breast Exam: no  Last mammogram: 3/23/23. Done at [facility], [place].    Last Completed Mammogram          MAMMOGRAM (Yearly) Next due on 3/23/2024    03/23/2023  Mammo Screening Digital Tomosynthesis Bilateral With CAD    07/25/2022  Mammo Diagnostic Left With CAD    03/22/2022  Mammo Diagnostic Digital Tomosynthesis Bilateral With CAD    01/28/2021  Mammo Screening Digital Tomosynthesis Bilateral With CAD    11/26/2019  Mammo Diagnostic Digital Tomosynthesis Bilateral With CAD    Only the first 5 history entries have been loaded, but more history exists.              Last colonoscopy: has had a colonoscopy 1 year(s) ago.    Last Completed Colonoscopy          COLORECTAL CANCER SCREENING (COLONOSCOPY - Every 5 Years) Next due on 9/7/2027 09/07/2022  SCANNED - COLONOSCOPY    03/01/2017  SCANNED - COLONOSCOPY     04/17/2008   Colonoscopy component of  COLONOSCOPY                  Last bone density scan (DEXA): Unknown date and results were Normal per pt  Exercises Regularly: no  Feelings of Anxiety or Depression: yes - anxiety and depression, self manageable      Tobacco Usage?: No       Current Outpatient Medications:   •  albuterol sulfate  (90 Base) MCG/ACT inhaler, INHALE 1 PUFF BY MOUTH FOUR TIMES DAILY AS NEEDED, Disp: , Rfl:   •  ALPRAZolam (XANAX) 0.5 MG tablet, Take 1 tablet by mouth., Disp: , Rfl:   •  aspirin 81 MG tablet, Take 1 tablet by mouth Daily., Disp: , Rfl:   •  Cholecalciferol (Vitamin D3) 1.25 MG (06912 UT) capsule, , Disp: , Rfl:   •  Cyanocobalamin (Vitamin B-12) 1000 MCG sublingual tablet, , Disp: , Rfl:   •  DULoxetine HCl 40 MG capsule delayed-release particles, Take 1 capsule by mouth Daily., Disp: , Rfl:   •  hydroxychloroquine (PLAQUENIL) 200 MG tablet, Take 1 tablet by mouth., Disp: , Rfl:   •  Omega-3 Fatty Acids (FISH OIL PO), Take  by mouth Daily., Disp: , Rfl:   •  ondansetron ODT (ZOFRAN-ODT) 4 MG disintegrating tablet, Place 1 tablet on the tongue Every 8 (Eight) Hours As Needed for Nausea or Vomiting., Disp: 20 tablet, Rfl: 0  •  potassium chloride (MICRO-K) 10 MEQ CR capsule, Take 1 capsule by mouth Daily., Disp: , Rfl:   •  amLODIPine (NORVASC) 5 MG tablet, Take 1 tablet by mouth Daily for 180 days., Disp: 30 tablet, Rfl: 5  •  atorvastatin (LIPITOR) 20 MG tablet, Take 1 tablet by mouth Every Night for 180 days., Disp: 30 tablet, Rfl: 5  •  carvedilol (COREG) 12.5 MG tablet, Take 1 tablet by mouth 2 (Two) Times a Day With Meals for 180 days., Disp: 60 tablet, Rfl: 5  •  Farxiga 10 MG tablet, Take 10 mg by mouth Every Morning Before Breakfast for 180 days., Disp: 30 tablet, Rfl: 5  •  hydroCHLOROthiazide (HYDRODIURIL) 25 MG tablet, Take 1 tablet by mouth Daily for 180 days., Disp: 30 tablet, Rfl: 5  •  ibuprofen (ADVIL,MOTRIN) 600 MG tablet, Take 1 tablet by mouth Every 6  (Six) Hours As Needed for Moderate Pain  for up to 90 days., Disp: 120 tablet, Rfl: 0  •  omeprazole (priLOSEC) 40 MG capsule, Take 1 capsule by mouth Every Night for 180 days., Disp: 30 capsule, Rfl: 5    Patient denies the need for medication refills today.    OB History             Para        Term   0            AB        Living           SAB        IAB        Ectopic        Molar        Multiple        Live Births                    Past Medical History:   Diagnosis Date   • Abnormal Pap smear of cervix    • Anemia    • Anxiety    • Bursitis, knee     acute right   • Chest pain     non cardiac   • Closed avulsion fracture of metatarsal bone of left foot 2020   • Colonic polyp    • Depression    • Diabetes mellitus    • Essential hypertension    • Fibroid    • GERD (gastroesophageal reflux disease)    • Headache    • Hyperlipidemia    • IFG (impaired fasting glucose)    • Neoplasm of uncertain behavior of skin     left face   • Osteoarthritis    • Ovarian cyst         Past Surgical History:   Procedure Laterality Date   • BREAST BIOPSY Right 2018    stereo   • CHOLECYSTECTOMY      ~    • COLONOSCOPY     • COLPOSCOPY     • KNEE SURGERY     • LAPAROSCOPIC CHOLECYSTECTOMY     • LYMPH NODE BIOPSY     • WISDOM TOOTH EXTRACTION         Health Maintenance   Topic Date Due   • Hepatitis B (1 of 3 - 3-dose series) Never done   • Pneumococcal Vaccine 0-64 (2 - PCV) 2018   • DIABETIC EYE EXAM  2022   • HEMOGLOBIN A1C  2022   • ANNUAL PHYSICAL  2022   • DIABETIC FOOT EXAM  2022   • LIPID PANEL  2023   • URINE MICROALBUMIN  2023   • Annual Gynecologic Pelvic and Breast Exam  2023   • INFLUENZA VACCINE  2023   • TDAP/TD VACCINES (3 - Td or Tdap) 2024   • MAMMOGRAM  2024   • PAP SMEAR  2025   • COLORECTAL CANCER SCREENING  2027   • HEPATITIS C SCREENING  Completed   • COVID-19 Vaccine  Completed   • ZOSTER VACCINE   "Completed       The additional following portions of the patient's history were reviewed and updated as appropriate: allergies, current medications, past family history, past medical history, past social history, past surgical history and problem list.    Review of Systems   Constitutional: Negative.    HENT: Negative.    Eyes: Negative.    Respiratory: Negative.    Cardiovascular: Negative.    Gastrointestinal: Negative.    Endocrine: Negative.    Genitourinary: Negative.    Musculoskeletal: Negative.    Skin: Negative.    Allergic/Immunologic: Negative.    Neurological: Negative.    Hematological: Negative.    Psychiatric/Behavioral: Negative.        I have reviewed and agree with the HPI, ROS, and historical information as entered above. Dyan Basilio MD    Objective   /82   Ht 157.5 cm (62\")   Wt 85.2 kg (187 lb 12.8 oz)   LMP 06/20/2017 (Exact Date)   BMI 34.35 kg/m²     Physical Exam  Vitals and nursing note reviewed. Exam conducted with a chaperone present.   Constitutional:       Appearance: She is well-developed.   HENT:      Head: Normocephalic and atraumatic.   Eyes:      Pupils: Pupils are equal, round, and reactive to light.   Neck:      Thyroid: No thyroid mass or thyromegaly.   Pulmonary:      Effort: Pulmonary effort is normal. No retractions.   Chest:      Chest wall: No mass.   Breasts:     Right: Normal. No mass, nipple discharge, skin change or tenderness.      Left: Normal. No mass, nipple discharge, skin change or tenderness.   Abdominal:      General: Bowel sounds are normal.      Palpations: Abdomen is soft. Abdomen is not rigid. There is no mass.      Tenderness: There is no abdominal tenderness. There is no guarding.      Hernia: No hernia is present. There is no hernia in the left inguinal area or right inguinal area.   Genitourinary:     Exam position: Lithotomy position.      Pubic Area: No rash.       Labia:         Right: No rash, tenderness or lesion.         Left: No rash, " tenderness or lesion.       Urethra: No urethral pain or urethral swelling.      Vagina: Normal. No vaginal discharge or lesions.      Cervix: No cervical motion tenderness, discharge, lesion or cervical bleeding.      Uterus: Normal. Not enlarged, not fixed and not tender.       Adnexa:         Right: No mass, tenderness or fullness.          Left: No mass, tenderness or fullness.        Rectum: No external hemorrhoid.   Musculoskeletal:      Cervical back: Normal range of motion. No muscular tenderness.      Right lower leg: No edema.      Left lower leg: No edema.   Skin:     General: Skin is warm and dry.   Neurological:      Mental Status: She is alert and oriented to person, place, and time.      Motor: Motor function is intact.   Psychiatric:         Mood and Affect: Mood and affect normal.         Behavior: Behavior normal.            Assessment and Plan    Problem List Items Addressed This Visit    None  Visit Diagnoses     Well woman exam with routine gynecological exam    -  Primary    Abnormal cervical Papanicolaou smear, unspecified abnormal pap finding        Relevant Orders    LIQUID-BASED PAP SMEAR WITH HPV GENOTYPING REGARDLESS OF INTERPRETATION (KAREL,COR,MAD)    Mild dysplasia of cervix (ZINA I)              1. GYN annual well woman exam.   2. Reviewed monthly self breast exams.  Instructed to call with lumps, pain, or breast discharge.  Yearly mammograms ordered.  3. Ordered mammogram today.  4. Reviewed BMI and weight loss as preventative health measures.   5. Recommended Flu Vaccine in Fall of each year.  6. Return in about 1 year (around 4/18/2024) for Annual physical. plan repeat pap in 1 year unless this pap higher grade abnl than mild dysplasia.     Dyan Basilio MD  04/18/2023

## 2023-04-24 LAB — REF LAB TEST METHOD: NORMAL

## 2023-10-19 DIAGNOSIS — R11.0 NAUSEA: ICD-10-CM

## 2023-10-19 RX ORDER — ONDANSETRON 4 MG/1
TABLET, ORALLY DISINTEGRATING ORAL
Qty: 20 TABLET | Refills: 0 | OUTPATIENT
Start: 2023-10-19

## 2024-04-30 ENCOUNTER — TRANSCRIBE ORDERS (OUTPATIENT)
Dept: ADMINISTRATIVE | Facility: HOSPITAL | Age: 59
End: 2024-04-30
Payer: COMMERCIAL

## 2024-04-30 DIAGNOSIS — Z12.31 SCREENING MAMMOGRAM FOR BREAST CANCER: Primary | ICD-10-CM

## 2024-05-06 ENCOUNTER — OFFICE VISIT (OUTPATIENT)
Dept: OBSTETRICS AND GYNECOLOGY | Facility: CLINIC | Age: 59
End: 2024-05-06
Payer: COMMERCIAL

## 2024-05-06 VITALS
DIASTOLIC BLOOD PRESSURE: 88 MMHG | BODY MASS INDEX: 34.19 KG/M2 | SYSTOLIC BLOOD PRESSURE: 132 MMHG | HEIGHT: 62 IN | WEIGHT: 185.8 LBS

## 2024-05-06 DIAGNOSIS — Z12.31 ENCOUNTER FOR SCREENING MAMMOGRAM FOR MALIGNANT NEOPLASM OF BREAST: Primary | ICD-10-CM

## 2024-05-06 DIAGNOSIS — Z01.411 ENCOUNTER FOR GYNECOLOGICAL EXAMINATION WITH ABNORMAL FINDING: ICD-10-CM

## 2024-05-06 DIAGNOSIS — N89.8 VAGINAL IRRITATION: ICD-10-CM

## 2024-05-06 PROCEDURE — 99396 PREV VISIT EST AGE 40-64: CPT | Performed by: NURSE PRACTITIONER

## 2024-05-06 PROCEDURE — 99459 PELVIC EXAMINATION: CPT | Performed by: NURSE PRACTITIONER

## 2024-05-06 RX ORDER — FLUCONAZOLE 150 MG/1
150 TABLET ORAL DAILY
Qty: 2 TABLET | Refills: 0 | Status: SHIPPED | OUTPATIENT
Start: 2024-05-06

## 2024-05-06 RX ORDER — FOLIC ACID 1 MG/1
1 TABLET ORAL DAILY
COMMUNITY
Start: 2024-02-14

## 2024-05-06 RX ORDER — METHOTREXATE 2.5 MG/1
15 TABLET ORAL
COMMUNITY
Start: 2024-02-14

## 2024-05-09 LAB — REF LAB TEST METHOD: NORMAL

## 2024-06-28 ENCOUNTER — HOSPITAL ENCOUNTER (OUTPATIENT)
Dept: MAMMOGRAPHY | Facility: HOSPITAL | Age: 59
Discharge: HOME | End: 2024-06-28
Admitting: INTERNAL MEDICINE
Payer: COMMERCIAL

## 2024-06-28 DIAGNOSIS — Z12.31 SCREENING MAMMOGRAM FOR BREAST CANCER: ICD-10-CM

## 2024-06-28 PROCEDURE — 77063 BREAST TOMOSYNTHESIS BI: CPT

## 2024-06-28 PROCEDURE — 77067 SCR MAMMO BI INCL CAD: CPT

## 2024-11-06 ENCOUNTER — TELEPHONE (OUTPATIENT)
Dept: OBSTETRICS AND GYNECOLOGY | Facility: CLINIC | Age: 59
End: 2024-11-06

## 2024-11-06 ENCOUNTER — OFFICE VISIT (OUTPATIENT)
Dept: OBSTETRICS AND GYNECOLOGY | Facility: CLINIC | Age: 59
End: 2024-11-06
Payer: COMMERCIAL

## 2024-11-06 VITALS
WEIGHT: 180.4 LBS | HEIGHT: 62 IN | SYSTOLIC BLOOD PRESSURE: 124 MMHG | BODY MASS INDEX: 33.2 KG/M2 | DIASTOLIC BLOOD PRESSURE: 72 MMHG

## 2024-11-06 DIAGNOSIS — N90.89 VULVAR LESION: Primary | ICD-10-CM

## 2024-11-06 DIAGNOSIS — N95.2 ATROPHIC VAGINITIS: ICD-10-CM

## 2024-11-06 NOTE — PROGRESS NOTES
Chief Complaint   Patient presents with    Follow-up         Subjective   HPI  Oseas Brasher is a 59 y.o. female, , who presents for evaluation of  mild intermittent vulvar itching, bilateral red lumps labia majora, and burning with wiping on the apex of the labia.    She states she has experienced this problem for 1 month.  Since she first discovered the issue it has remained unchanged. She describes the severity as severe itching. Patient notes aggravating factors include sitting, wiping, walking and alleviating factors are neosporin.  The patient reports additional symptoms as none.  The patient does not have a history of a bartholin's cyst.    Patient's last menstrual period was 2017 (exact date).  Partner Status: Marital Status: single.  New Partners since last visit: no.  Desires STD Screening: no. Her STD history is significant for:  none.     Additional OB/GYN History   Last Pap : 2024  Last Completed Pap Smear            PAP SMEAR (Every 3 Years) Next due on 2024  LIQUID-BASED PAP SMEAR WITH HPV GENOTYPING REGARDLESS OF INTERPRETATION (KAREL,COR,MAD)    2023  LIQUID-BASED PAP SMEAR WITH HPV GENOTYPING REGARDLESS OF INTERPRETATION (KAREL,COR,[facility]AD)    2022  SCANNED - PAP SMEAR    2021  Done    2017  PapIG, HPV, Rfx 16 / 18    Only the first 5 history entries have been loaded, but more history exists.                    Tobacco Usage?: No       Current Outpatient Medications:     albuterol sulfate  (90 Base) MCG/ACT inhaler, INHALE 1 PUFF BY MOUTH FOUR TIMES DAILY AS NEEDED, Disp: , Rfl:     ALPRAZolam (XANAX) 0.5 MG tablet, Take 1 tablet by mouth., Disp: , Rfl:     amLODIPine (NORVASC) 5 MG tablet, Take 1 tablet by mouth Daily for 180 days., Disp: 30 tablet, Rfl: 5    aspirin 81 MG tablet, Take 1 tablet by mouth Daily., Disp: , Rfl:     atorvastatin (LIPITOR) 20 MG tablet, Take 1 tablet by mouth Every Night for 180 days., Disp: 30  tablet, Rfl: 5    carvedilol (COREG) 12.5 MG tablet, Take 1 tablet by mouth 2 (Two) Times a Day With Meals for 180 days., Disp: 60 tablet, Rfl: 5    Cholecalciferol (Vitamin D3) 1.25 MG (59962 UT) capsule, , Disp: , Rfl:     clobetasol propionate (TEMOVATE) 0.05 % cream, Apply 1 Application topically to the appropriate area as directed 2 (Two) Times a Day., Disp: 15 g, Rfl: 0    DULoxetine HCl 40 MG capsule delayed-release particles, Take 1 capsule by mouth Daily., Disp: , Rfl:     estradiol (ESTRACE VAGINAL) 0.1 MG/GM vaginal cream, Insert 0.5 g per vagina twice weekly, Disp: 1 each, Rfl: 5    Farxiga 10 MG tablet, Take 10 mg by mouth Every Morning Before Breakfast for 180 days., Disp: 30 tablet, Rfl: 5    folic acid (FOLVITE) 1 MG tablet, Take 1 tablet by mouth Daily., Disp: , Rfl:     hydroCHLOROthiazide (HYDRODIURIL) 25 MG tablet, Take 1 tablet by mouth Daily for 180 days., Disp: 30 tablet, Rfl: 5    hydroxychloroquine (PLAQUENIL) 200 MG tablet, Take 1 tablet by mouth., Disp: , Rfl:     methotrexate 2.5 MG tablet, Take 6 tablets by mouth., Disp: , Rfl:     Omega-3 Fatty Acids (FISH OIL PO), Take  by mouth Daily., Disp: , Rfl:     omeprazole (priLOSEC) 40 MG capsule, Take 1 capsule by mouth Every Night for 180 days., Disp: 30 capsule, Rfl: 5    ondansetron ODT (ZOFRAN-ODT) 4 MG disintegrating tablet, Place 1 tablet on the tongue Every 8 (Eight) Hours As Needed for Nausea or Vomiting., Disp: 20 tablet, Rfl: 0    potassium chloride (MICRO-K) 10 MEQ CR capsule, Take 1 capsule by mouth Daily., Disp: , Rfl:      Past Medical History:   Diagnosis Date    Abnormal Pap smear of cervix     Anemia     Anxiety     Bursitis, knee     acute right    Chest pain     non cardiac    Closed avulsion fracture of metatarsal bone of left foot 08/13/2020    Colonic polyp     Depression     Diabetes mellitus     Essential hypertension     Fibroid     GERD (gastroesophageal reflux disease)     Headache     Hyperlipidemia     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    Review of Systems   Respiratory: Negative.  Negative for shortness of breath.    Cardiovascular: Negative.  Negative for chest pain.   Gastrointestinal: Negative.  Negative for abdominal distention, abdominal pain and constipation.   Genitourinary:  Positive for vaginal pain. Negative for dyspareunia (N/A), dysuria, pelvic pain, pelvic pressure, urinary incontinence, vaginal bleeding and vaginal discharge.       I have reviewed and agree with the HPI, ROS, and historical information as entered above. Samantha Tello, APRN      Objective   /72 (BP Location: Right arm, Patient Position: Sitting, Cuff Size: Adult)   Ht 157.5 cm (62.01\")   Wt 81.8 kg (180 lb 6.4 oz)   LMP 06/20/2017 (Exact Date)   BMI 32.99 kg/m²     Physical Exam  Vitals and nursing note reviewed. Exam conducted with a chaperone present.   Constitutional:       General: She is not in acute distress.     Appearance: Normal appearance. She is not ill-appearing or toxic-appearing.   HENT:      Head: Normocephalic and atraumatic.   Pulmonary:      Effort: Pulmonary effort is normal.   Abdominal:      Palpations: Abdomen is soft. There is no mass.      Tenderness: There is no abdominal tenderness.      Hernia: No hernia is present.   Genitourinary:     Labia:         Right: Tenderness, lesion and injury present. No rash.         Left: Tenderness, lesion and injury present. No rash.       Vagina: Normal. No signs of injury. No vaginal discharge, erythema, " tenderness, bleeding or lesions.      Cervix: Normal. No cervical motion tenderness, discharge, friability, lesion, erythema or cervical bleeding.      Uterus: Normal. Not enlarged and not tender.       Adnexa: Right adnexa normal and left adnexa normal.        Right: No mass or tenderness.          Left: No mass or tenderness.            Comments: Multiple open (superficial) excoriated circular lesions, approx 2mm Scant serous drainage noted.      Atrophic vaginitis  Neurological:      Mental Status: She is alert and oriented to person, place, and time.   Psychiatric:         Mood and Affect: Mood normal.         Behavior: Behavior normal.         Assessment & Plan     Assessment       Problem List Items Addressed This Visit       Atrophic vaginitis    Overview     11/6/2024- Begin Estrace vaginal cream as directed. Premarin sample given.          Relevant Medications    estradiol (ESTRACE VAGINAL) 0.1 MG/GM vaginal cream     Other Visit Diagnoses       Vulvar lesion    -  Primary    Relevant Orders    HSV 1/2, PCR (Non-CSF) - Swab, Vulva (Completed)    NuSwab VG+ - Swab, Vagina (Completed)            Plan   Potential etiologies reviewed with patient.   Discussed atropic vaginitis in detail. Nuswab and HSV pending to r/o infection. Treatment options reviewed including non hormonal vaginal moisturizers 2-3x/week and lubricants during IC. We also discussed vaginal estrogen options including benefits and risks. List of options provided to patient. She will begin Premarin cream as directed (QHS x 2 weeks, then 2x  weekly for maintenance). Estrace vaginal cream prescribed.    Advised to begin Sitz Baths and Aquaphor for open areas until healed. Call for any s/sx of infection.   Estrace and atrophic vaginitis education included in patient instructions.     Return in about 6 months (around 5/6/2025) for Annual physical or sooner if needed, [place].        Samantha Tello, APRN  11/06/2024

## 2024-11-06 NOTE — LETTER
November 6, 2024      [facility]  [address]  [place] [address]  [phone]          Patient: Oseas Brasher   YOB: 1965   Date of Visit: 11/6/2024       To Whom It May Concern:    Oseas Brasher was seen at [facility] on 11/6/2024.    Please excuse Heather from work 11/06/24.        Sincerely,       Samantha Tello, APRN

## 2024-11-08 LAB
A VAGINAE DNA VAG QL NAA+PROBE: NORMAL SCORE
BVAB2 DNA VAG QL NAA+PROBE: NORMAL SCORE
C ALBICANS DNA VAG QL NAA+PROBE: NEGATIVE
C GLABRATA DNA VAG QL NAA+PROBE: NEGATIVE
C TRACH DNA SPEC QL NAA+PROBE: NEGATIVE
MEGA1 DNA VAG QL NAA+PROBE: NORMAL SCORE
N GONORRHOEA DNA VAG QL NAA+PROBE: NEGATIVE
T VAGINALIS DNA VAG QL NAA+PROBE: NEGATIVE

## 2024-11-09 LAB
HSV1 DNA SPEC QL NAA+PROBE: NEGATIVE
HSV2 DNA SPEC QL NAA+PROBE: NEGATIVE

## 2024-11-11 PROBLEM — N95.2 ATROPHIC VAGINITIS: Status: ACTIVE | Noted: 2024-11-11

## 2024-11-11 RX ORDER — ESTRADIOL 0.1 MG/G
CREAM VAGINAL
Qty: 1 EACH | Refills: 5 | Status: SHIPPED | OUTPATIENT
Start: 2024-11-11

## 2025-02-05 ENCOUNTER — OFFICE VISIT (OUTPATIENT)
Dept: OBSTETRICS AND GYNECOLOGY | Facility: CLINIC | Age: 60
End: 2025-02-05
Payer: COMMERCIAL

## 2025-02-05 VITALS
BODY MASS INDEX: 36.44 KG/M2 | SYSTOLIC BLOOD PRESSURE: 130 MMHG | WEIGHT: 198 LBS | DIASTOLIC BLOOD PRESSURE: 80 MMHG | HEIGHT: 62 IN

## 2025-02-05 DIAGNOSIS — N95.2 ATROPHIC VAGINITIS: ICD-10-CM

## 2025-02-05 DIAGNOSIS — N89.8 VAGINAL IRRITATION: Primary | ICD-10-CM

## 2025-02-05 DIAGNOSIS — N76.1 CHRONIC VAGINITIS: ICD-10-CM

## 2025-02-05 LAB
BILIRUB BLD-MCNC: NEGATIVE MG/DL
GLUCOSE UR STRIP-MCNC: ABNORMAL MG/DL
KETONES UR QL: NEGATIVE
LEUKOCYTE EST, POC: NEGATIVE
NITRITE UR-MCNC: NEGATIVE MG/ML
PH UR: 6 [PH] (ref 5–8)
PROT UR STRIP-MCNC: NEGATIVE MG/DL
RBC # UR STRIP: NEGATIVE /UL
SP GR UR: 1.01 (ref 1–1.03)
UROBILINOGEN UR QL: NORMAL

## 2025-02-05 RX ORDER — TERCONAZOLE 4 MG/G
1 CREAM VAGINAL NIGHTLY
Qty: 45 G | Refills: 0 | Status: SHIPPED | OUTPATIENT
Start: 2025-02-05 | End: 2025-02-12

## 2025-02-05 RX ORDER — CONJUGATED ESTROGENS 0.62 MG/G
CREAM VAGINAL
Qty: 30 G | Refills: 2 | Status: SHIPPED | OUTPATIENT
Start: 2025-02-05

## 2025-02-05 NOTE — PROGRESS NOTES
Chief Complaint   Patient presents with    Vaginitis         Subjective   HPI  Oseas Brasher is a 59 y.o. female, , who presents for evaluation of  vaginal itching, burning, bumps.  She reports that the problem is the same as her last visit.  She reports intermittent vulvar itching, bilateral red lumps labia majora, and burning with wiping on the apex of the labia. She describes the severity as severe itching. Patient notes aggravating factors include sitting, wiping, walking and alleviating factors are Aquaphor. She has had this problem since October.     She was seen if office 2024 w/ c/o mild intermittent vulvar itching, bilateral red lumps labia majora, and burning with wiping on the apex of the labia. HSV culture and Nuswab were negative for BV, yeast, and STIs. The plan was to begin vaginal estradiol cream QHS x 2 weeks, then 2x weekly for maintenance. Patient states she has been using Estradiol cream QHS without relief.     Prior to the onset of symptoms she was not on antibiotics.  She has not recently changed soaps/detergents/toilet tissue.  Prior to this visit, she has used aquaphor in an attempt to improve her symptoms.     Sexual History    She is not currently sexually active.    Menstrual History:    Patient's last menstrual period was 2017 (exact date).        Additional OB/GYN History   Last Pap : 24  Last Completed Pap Smear            PAP SMEAR (Every 3 Years) Next due on 2024  LIQUID-BASED PAP SMEAR WITH HPV GENOTYPING REGARDLESS OF INTERPRETATION (KAREL,COR,MAD)    2023  LIQUID-BASED PAP SMEAR WITH HPV GENOTYPING REGARDLESS OF INTERPRETATION (KAREL,COR,MAD)    2022  SCANNED - PAP SMEAR    2021  Done    2017  PapIG, HPV, Rfx 16 / 18    Only the first 5 history entries have been loaded, but more history exists.                    OB History          0    Para   0    Term   0       0    AB   0    Living   0     POST-OP DIAGNOSIS:  Elevated sedimentation rate 20-Nov-2023 19:24:11  Deborah Robledo  Daily headache 20-Nov-2023 19:23:52  Deborah Robledo   "     SAB   0    IAB   0    Ectopic   0    Molar   0    Multiple   0    Live Births   0                The additional following portions of the patient's history were reviewed and updated as appropriate: allergies, current medications, past family history, past medical history, past social history, past surgical history, and problem list.    Review of Systems   Respiratory: Negative.  Negative for shortness of breath.    Cardiovascular: Negative.  Negative for chest pain.   Gastrointestinal: Negative.  Negative for abdominal distention, abdominal pain and constipation.   Genitourinary: Negative.  Positive for vaginal pain. Negative for dyspareunia, dysuria, pelvic pain, pelvic pressure, urinary incontinence, vaginal bleeding and vaginal discharge.     All other systems reviewed and are negative.     I have reviewed and agree with the HPI, ROS, and historical information as entered above. Samantha Tello, APRN      Objective   /80   Ht 157.5 cm (62.01\")   Wt 89.8 kg (198 lb)   LMP 06/20/2017 (Exact Date)   Breastfeeding No   BMI 36.20 kg/m²     Physical Exam  Vitals and nursing note reviewed. Exam conducted with a chaperone present.   Constitutional:       General: She is not in acute distress.     Appearance: Normal appearance. She is not ill-appearing or toxic-appearing.   HENT:      Head: Normocephalic and atraumatic.   Pulmonary:      Effort: Pulmonary effort is normal.   Abdominal:      Palpations: Abdomen is soft. There is no mass.      Tenderness: There is no abdominal tenderness.      Hernia: No hernia is present.   Genitourinary:     Labia:         Right: Injury present. No rash, tenderness or lesion.         Left: Injury present. No rash, tenderness or lesion.       Vagina: Normal. No signs of injury. No vaginal discharge, erythema, tenderness, bleeding or lesions.      Cervix: Normal. No cervical motion tenderness, discharge, friability, lesion, erythema or cervical bleeding.      Uterus: " Normal. Not enlarged and not tender.       Adnexa: Right adnexa normal and left adnexa normal.        Right: No mass or tenderness.          Left: No mass or tenderness.            Comments: Atrophic vaginal tissue noted.   Neurological:      Mental Status: She is alert and oriented to person, place, and time.   Psychiatric:         Mood and Affect: Mood normal.         Behavior: Behavior normal.         Assessment & Plan     Assessment and Plan    Problem List Items Addressed This Visit       Atrophic vaginitis    Overview     11/6/2024- Begin Estrace vaginal cream as directed. Premarin sample given.          Relevant Medications    Premarin 0.625 MG/GM vaginal cream     Other Visit Diagnoses       Vaginal irritation    -  Primary    Relevant Medications    terconazole (TERAZOL 7) 0.4 % vaginal cream    Other Relevant Orders    POC Urinalysis Dipstick (Completed)    Candida panel, PCR - Swab, Vagina    NuSwab Vaginitis (VG) - Swab, Vagina (Completed)    Chronic vaginitis        Relevant Medications    terconazole (TERAZOL 7) 0.4 % vaginal cream    Premarin 0.625 MG/GM vaginal cream    Other Relevant Orders    Candida panel, PCR - Swab, Vagina    NuSwab Vaginitis (VG) - Swab, Vagina (Completed)              Atrophic vaginitis  Nuswab pending to r/o infection- BV and extended yeast panel. Discussed proper use of vaginal estrogen. Generic estrace cream does not appear to be improving symptoms. She will begin brand name only- Premarin vaginal cream 1gm twice weekly after completing Terazol cream. She will call if symptoms persist or worsen.   Empiric Terazol vaginal cream prescribed.   Premarin cream- Brand only prescribed. Will treat further if indicated.    Counseling on Vaginitis provided  Vaginal estrogen cream, Vaginitis, and Atrophic vaginitis education included in patient instructions.       Samantha Tello, APRN  02/05/2025

## 2025-02-07 LAB
A VAGINAE DNA VAG QL NAA+PROBE: NORMAL SCORE
BVAB2 DNA VAG QL NAA+PROBE: NORMAL SCORE
C ALBICANS DNA VAG QL NAA+PROBE: NEGATIVE
C GLABRATA DNA VAG QL NAA+PROBE: NEGATIVE
MEGA1 DNA VAG QL NAA+PROBE: NORMAL SCORE
T VAGINALIS DNA VAG QL NAA+PROBE: NEGATIVE

## 2025-02-11 LAB — SPECIMEN STATUS: NORMAL

## 2025-05-13 ENCOUNTER — OFFICE VISIT (OUTPATIENT)
Dept: OBSTETRICS AND GYNECOLOGY | Facility: CLINIC | Age: 60
End: 2025-05-13
Payer: COMMERCIAL

## 2025-05-13 VITALS
WEIGHT: 176.6 LBS | BODY MASS INDEX: 32.5 KG/M2 | HEIGHT: 62 IN | SYSTOLIC BLOOD PRESSURE: 122 MMHG | DIASTOLIC BLOOD PRESSURE: 82 MMHG

## 2025-05-13 DIAGNOSIS — Z12.39 ENCOUNTER FOR SCREENING FOR MALIGNANT NEOPLASM OF BREAST, UNSPECIFIED SCREENING MODALITY: Primary | ICD-10-CM

## 2025-05-13 NOTE — PROGRESS NOTES
Gynecologic Annual Exam Note        GYN Annual Exam     CC - Here for annual exam.        HPI  Oseas Brasher is a 59 y.o. female, , who presents for annual well woman exam as a established patient.  She is postmenopausal.. Denies vaginal bleeding.   There were no changes to her medical or surgical history since her last visit. Marital Status: single.  She is not currently sexually active. STD testing recommendations have been explained to the patient and she declines STD testing.    The patient would like to discuss the following complaints today: occasional vaginal irritation, she denies vaginal odor or discharge. She reports vaginal irritation started around 8 months ago. She was treated previously with Terconazole which helped with symptoms. Her symptoms are intermittent, but are currently improved. She wonders if it was during time of increased glc.     Additional OB/GYN History   On HRT? No    Last Pap : 2024. Results: negative. HPV: negative.   Last Completed Pap Smear            Upcoming       PAP SMEAR (Every 3 Years) Next due on 2024  LIQUID-BASED PAP SMEAR WITH HPV GENOTYPING REGARDLESS OF INTERPRETATION (KAREL,COR,MAD)    2023  LIQUID-BASED PAP SMEAR WITH HPV GENOTYPING REGARDLESS OF INTERPRETATION (KAREL,COR,MAD)    2022  SCANNED - PAP SMEAR    2021  Done    2017  PapIG, HPV, Rfx 16 / 18     Only the first 5 history entries have been loaded, but more history exists.                        History of abnormal Pap smear: yes -  ASCUS HPV pool+, 22 colpo- mild dysplasia   Family history of uterine, colon, breast, or ovarian cancer: yes - paternal cousin- ovarian  Performs monthly Self-Breast Exam: no  Last mammogram: 2024. Done at . There is a copy in the chart.    Last Completed Mammogram    This patient has no relevant Health Maintenance data.       Last colonoscopy: has had a colonoscopy 3 years ago    Last Completed Colonoscopy             Needs Review       COLORECTAL CANCER SCREENING (COLONOSCOPY - Every 5 Years) Tentatively due on 9/7/2027 09/07/2022  SCANNED - COLONOSCOPY    03/01/2017  SCANNED - COLONOSCOPY    04/17/2008   Colonoscopy component of  COLONOSCOPY                            Her last bone density scan was 3 years ago and results were Normal  Exercises Regularly: yes  Feelings of Anxiety or Depression: yes - treated with medication       Tobacco Usage?: No       Current Outpatient Medications:     albuterol sulfate  (90 Base) MCG/ACT inhaler, INHALE 1 PUFF BY MOUTH FOUR TIMES DAILY AS NEEDED, Disp: , Rfl:     ALPRAZolam (XANAX) 0.5 MG tablet, Take 1 tablet by mouth., Disp: , Rfl:     aspirin 81 MG tablet, Take 1 tablet by mouth Daily., Disp: , Rfl:     Cholecalciferol (Vitamin D3) 1.25 MG (76885 UT) capsule, , Disp: , Rfl:     DULoxetine HCl 40 MG capsule delayed-release particles, Take 1 capsule by mouth Daily., Disp: , Rfl:     hydroxychloroquine (PLAQUENIL) 200 MG tablet, Take 1 tablet by mouth., Disp: , Rfl:     Omega-3 Fatty Acids (FISH OIL PO), Take  by mouth Daily., Disp: , Rfl:     ondansetron ODT (ZOFRAN-ODT) 4 MG disintegrating tablet, Place 1 tablet on the tongue Every 8 (Eight) Hours As Needed for Nausea or Vomiting., Disp: 20 tablet, Rfl: 0    amLODIPine (NORVASC) 5 MG tablet, Take 1 tablet by mouth Daily for 180 days., Disp: 30 tablet, Rfl: 5    atorvastatin (LIPITOR) 20 MG tablet, Take 1 tablet by mouth Every Night for 180 days., Disp: 30 tablet, Rfl: 5    carvedilol (COREG) 12.5 MG tablet, Take 1 tablet by mouth 2 (Two) Times a Day With Meals for 180 days., Disp: 60 tablet, Rfl: 5    Farxiga 10 MG tablet, Take 10 mg by mouth Every Morning Before Breakfast for 180 days., Disp: 30 tablet, Rfl: 5    hydroCHLOROthiazide (HYDRODIURIL) 25 MG tablet, Take 1 tablet by mouth Daily for 180 days., Disp: 30 tablet, Rfl: 5    omeprazole (priLOSEC) 40 MG capsule, Take 1 capsule by mouth Every Night for 180  days., Disp: 30 capsule, Rfl: 5    Patient denies the need for medication refills today.    OB History          0    Para   0    Term   0       0    AB   0    Living   0         SAB   0    IAB   0    Ectopic   0    Molar   0    Multiple   0    Live Births   0                Past Medical History:   Diagnosis Date    Abnormal Pap smear of cervix     Allergic     dye used in CT scan, Z-pack    Anemia     Anxiety     Bursitis, knee     acute right    Chest pain     non cardiac    Closed avulsion fracture of metatarsal bone of left foot 2020    Colonic polyp     Depression     Diabetes mellitus     Essential hypertension     Fibroid     GERD (gastroesophageal reflux disease)     Headache     History of medical problems 21    seronegative RA    Hyperlipidemia     Neoplasm of uncertain behavior of skin     left face    Obesity     non-morbid    Osteoarthritis     Ovarian cyst         Past Surgical History:   Procedure Laterality Date    BREAST BIOPSY Right 2018    stereo    CHOLECYSTECTOMY      ~     COLONOSCOPY      COLPOSCOPY      FRACTURE SURGERY      left orbital    KNEE SURGERY      LAPAROSCOPIC CHOLECYSTECTOMY      LYMPH NODE BIOPSY      WISDOM TOOTH EXTRACTION         Health Maintenance   Topic Date Due    URINE MICROALBUMIN-CREATININE RATIO (uACR)  2017    Pneumococcal Vaccine 50+ (2 of 2 - PCV) 2018    DIABETIC EYE EXAM  2022    HEMOGLOBIN A1C  2022    ANNUAL PHYSICAL  2022    DIABETIC FOOT EXAM  2022    LIPID PANEL  2023    Annual Gynecologic Pelvic and Breast Exam  2025    MAMMOGRAM  2025    INFLUENZA VACCINE  2025    PAP SMEAR  2027    COLORECTAL CANCER SCREENING  2027    TDAP/TD VACCINES (4 - Td or Tdap) 2033    HEPATITIS C SCREENING  Completed    COVID-19 Vaccine  Completed    ZOSTER VACCINE  Completed       The additional following portions of the patient's history were reviewed and updated as  "appropriate: allergies, current medications, past family history, past medical history, past social history, past surgical history, and problem list.    Review of Systems   Constitutional: Negative.    HENT: Negative.     Eyes: Negative.    Respiratory: Negative.     Cardiovascular: Negative.    Gastrointestinal: Negative.    Endocrine: Negative.    Genitourinary: Negative.    Musculoskeletal: Negative.    Skin: Negative.    Allergic/Immunologic: Negative.    Neurological: Negative.    Hematological: Negative.    Psychiatric/Behavioral: Negative.         I have reviewed and agree with the HPI, ROS, and historical information as entered above. Dyan Basilio MD      Objective   /82   Ht 157.5 cm (62.01\")   Wt 80.1 kg (176 lb 9.6 oz)   LMP 06/20/2017 (Exact Date)   BMI 32.29 kg/m²     Physical Exam  Vitals and nursing note reviewed. Exam conducted with a chaperone present.   Constitutional:       Appearance: She is well-developed.   HENT:      Head: Normocephalic and atraumatic.   Eyes:      Pupils: Pupils are equal, round, and reactive to light.   Neck:      Thyroid: No thyroid mass or thyromegaly.   Pulmonary:      Effort: Pulmonary effort is normal. No retractions.   Chest:      Chest wall: No mass.   Breasts:     Right: Normal. No mass, nipple discharge, skin change or tenderness.      Left: Normal. No mass, nipple discharge, skin change or tenderness.   Abdominal:      General: Bowel sounds are normal.      Palpations: Abdomen is soft. Abdomen is not rigid. There is no mass.      Tenderness: There is no abdominal tenderness. There is no guarding.      Hernia: No hernia is present. There is no hernia in the left inguinal area or right inguinal area.   Genitourinary:     Exam position: Lithotomy position.      Pubic Area: No rash.       Labia:         Right: No rash, tenderness or lesion.         Left: No rash, tenderness or lesion.       Urethra: No urethral pain or urethral swelling.      Vagina: Normal. " No vaginal discharge or lesions.      Cervix: No cervical motion tenderness, discharge, lesion or cervical bleeding.      Uterus: Normal. Not enlarged, not fixed and not tender.       Adnexa:         Right: No mass, tenderness or fullness.          Left: No mass, tenderness or fullness.        Rectum: No external hemorrhoid.   Musculoskeletal:      Cervical back: Normal range of motion. No muscular tenderness.      Right lower leg: No edema.      Left lower leg: No edema.   Skin:     General: Skin is warm and dry.   Neurological:      Mental Status: She is alert and oriented to person, place, and time.      Motor: Motor function is intact.   Psychiatric:         Mood and Affect: Mood and affect normal.         Behavior: Behavior normal.            Assessment and Plan    Problem List Items Addressed This Visit    None  Visit Diagnoses         Encounter for screening for malignant neoplasm of breast, unspecified screening modality    -  Primary    Relevant Orders    Mammo Screening Digital Tomosynthesis Bilateral With CAD    Mammo Screening Digital Tomosynthesis Bilateral With CAD            GYN annual well woman exam.   Reviewed monthly self breast exams.  Instructed to call with lumps, pain, or breast discharge.  Yearly mammograms ordered.  Recommended use of Vitamin D and getting adequate calcium in her diet. (1500mg)  Reviewed exercise as a preventative health measures.   She will call fi vulvar symptoms flare agagin.   Return in about 1 year (around 5/13/2026) for Annual physical.         Dyan Basilio MD  05/13/2025

## 2025-05-15 LAB — REF LAB TEST METHOD: NORMAL
